# Patient Record
Sex: FEMALE | Employment: UNEMPLOYED | URBAN - METROPOLITAN AREA
[De-identification: names, ages, dates, MRNs, and addresses within clinical notes are randomized per-mention and may not be internally consistent; named-entity substitution may affect disease eponyms.]

---

## 2020-07-02 NOTE — PROGRESS NOTES
Lam Thompson   1985, 37 y.o. female   [id number]       Referring Provider: Merari Owens DO  Referral Type: In an order in [address] [place] [address]    Reason for Visit: Evaluation of suspected change in hearing, tinnitus, or balance. ADULT AUDIOLOGIC EVALUATION      Lam Thompson is a 37 y.o. female seen today, 7/6/2020 , for an initial audiologic evaluation. Patient was seen by Merari Owens DO following today's evaluation. AUDIOLOGIC AND OTHER PERTINENT MEDICAL HISTORY:      Lam Thompson noted aural fullness, tinnitus and history of occupational noise exposure. Patient reports right sided tinnitus that began about a month ago. She has been keeping cotton in her right ear to help with the sound, though it is no longer providing relief. Patient states she has experienced significant noise from working at a [employer] with a headset on her right ear. She also notes pressure in the right ear today. Lam Thompson denied otalgia, otorrhea, dizziness, imbalance, history of falls, history of head trauma, history of ear surgery and family history of hearing loss. Date: 7/6/2020     IMPRESSIONS:      AU: Hearing WNL, excellent WRS, Type A Tymps      Hearing within normal limits. Patient to follow medical recommendations per Merari Owens DO .    ASSESSMENT AND FINDINGS:     Otoscopy revealed: Clear ear canals bilaterally    RIGHT EAR:  Hearing Sensitivity: Normal hearing sensitivity  Speech Recognition Threshold: 15 dB HL  Word Recognition: Excellent (%), based on NU-6 25-word list at 45 dBHL using recorded speech stimuli. Tympanometry: Normal peak pressure and compliance, Type A tympanogram, consistent with normal middle ear function. Acoustic Reflexes: Ipsilateral: Did not test. Contralateral: Did not test.    LEFT EAR:  Hearing Sensitivity: Normal hearing sensitivity  Speech Recognition Threshold: 15 dB HL  Word Recognition: Excellent (%), based on NU-6 25-word list at 45 dBHL using recorded speech stimuli. Tympanometry: Normal peak pressure and compliance, Type A tympanogram, consistent with normal middle ear function. Acoustic Reflexes: Ipsilateral: Did not test. Contralateral: Did not test.    Reliability: Good  Transducer: Inserts    See scanned audiogram dated 7/6/2020  for results. PATIENT EDUCATION:       The following items were discussed with the patient:    - Good Communication Strategies,    - Tinnitus Management Strategies   - Noise-Induced Hearing Loss and use of Hearing Protection Devices (HPDs)    Educational information was shared in the After Visit Summary. RECOMMENDATIONS:                                                                                                                                                                                                                                                            The following items are recommended based on patient report and results from today's appointment:   - Continue medical follow-up with Rico Pena DO.   - Retest hearing as medically indicated and/or sooner if a change in hearing is noted. - Utilize \"Good Communication Strategies\" as discussed to assist in speech understanding with communication partners. ,    - Maintain a sound enriched environment to assist in the management of tinnitus symptoms.     - Use hearing protection devices (HPDs), such as protective ear muffs and ear plugs, when exposed to dangerous sound levels.        Tawanda Cheek  Audiologist    Chart CC'd to: Rico Pena DO      Degree of   Hearing Sensitivity dB Range   Within Normal Limits (WNL) 0 - 20   Mild 20 - 40   Moderate 40 - 55   Moderately-Severe 55 - 70   Severe 70 - 90   Profound 90 +

## 2020-07-06 ENCOUNTER — TELEPHONE (OUTPATIENT)
Dept: ENT CLINIC | Age: 35
End: 2020-07-06

## 2020-07-06 ENCOUNTER — PROCEDURE VISIT (OUTPATIENT)
Dept: AUDIOLOGY | Age: 35
End: 2020-07-06
Payer: COMMERCIAL

## 2020-07-06 ENCOUNTER — OFFICE VISIT (OUTPATIENT)
Dept: ENT CLINIC | Age: 35
End: 2020-07-06
Payer: COMMERCIAL

## 2020-07-06 VITALS
WEIGHT: 205.6 LBS | HEART RATE: 85 BPM | HEIGHT: 67 IN | TEMPERATURE: 97.7 F | SYSTOLIC BLOOD PRESSURE: 106 MMHG | BODY MASS INDEX: 32.27 KG/M2 | DIASTOLIC BLOOD PRESSURE: 63 MMHG

## 2020-07-06 PROCEDURE — 92557 COMPREHENSIVE HEARING TEST: CPT | Performed by: AUDIOLOGIST

## 2020-07-06 PROCEDURE — 92567 TYMPANOMETRY: CPT | Performed by: AUDIOLOGIST

## 2020-07-06 PROCEDURE — 99203 OFFICE O/P NEW LOW 30 MIN: CPT | Performed by: OTOLARYNGOLOGY

## 2020-07-06 PROCEDURE — G8417 CALC BMI ABV UP PARAM F/U: HCPCS | Performed by: OTOLARYNGOLOGY

## 2020-07-06 PROCEDURE — 1036F TOBACCO NON-USER: CPT | Performed by: OTOLARYNGOLOGY

## 2020-07-06 PROCEDURE — G8427 DOCREV CUR MEDS BY ELIG CLIN: HCPCS | Performed by: OTOLARYNGOLOGY

## 2020-07-06 RX ORDER — HYDROCODONE BITARTRATE AND ACETAMINOPHEN 5; 325 MG/1; MG/1
TABLET ORAL PRN
COMMUNITY
End: 2020-08-05

## 2020-07-06 RX ORDER — METHYLPREDNISOLONE 4 MG/1
TABLET ORAL
Qty: 1 KIT | Refills: 0 | Status: SHIPPED | OUTPATIENT
Start: 2020-07-06 | End: 2020-07-12

## 2020-07-06 NOTE — TELEPHONE ENCOUNTER
Pt called in to let Dr. Emanuel Phelps know that she checked her pulse, and it matched up with the noise she hears in her ears. She wanted to know if she should take the medication that she was prescribed today or if she \"hold off\". Please call pt back at [phone].

## 2020-07-06 NOTE — PATIENT INSTRUCTIONS
Good Communication Strategies    Communication can be challenging for anyone, but can be especially difficult for those with some degree of hearing loss. While we may not be able to control every factor that may lead to difficulty with communication, there are Good Communication Strategies that we can all use in our day-to-day lives. Communication takes both parties working together for it to be successful. Tips as a Listener:   1. Control your environment. It is important to limit the amount of background noise in the room when possible. You should also consider having a good light source in the room to best see the other person. 2. Ask for clarification. Instead of saying \"What?\", you can use parts of what you heard to make a new question. For example, if you heard the word \"Thursday\" but not the rest of the week, you may ask \"What was that about Thursday? \" or \"What did you want to do Thursday? \". This shows the person talking that you are listening and will help them better explain what they are saying. 3. Be an advocate for yourself. If you are hearing but not understanding, tell the other person \"I can hear you, but I need you to slow down when you speak. \"  Or if someone is facing the other direction, say \"I cannot hear you when you are not looking at me when we talk. \"       Tips as a Talker:   - Sit or stand 3 to 6 feet away to maximize audibility         -- It is unrealistic to believe someone else will fully hear your message if you are speaking from across the room or in a different room in the house   - Stay at eye level to help with visual cues   - Make sure you have the persons attention before speaking   - Use facial expressions and gestures to accentuate your message   - Raise your voice slightly (do not scream)   - Speak slowly and distinctly   - Use short, simple sentences   - Rephrase your words if the person is having a hard time understanding you    - To avoid distortion, dont speak directly into a persons ear      Some additional items that may be helpful:   - Remain patient - this is important for both parties   - Write down items that still cannot be heard/understood. You may write with pen/paper or consider typing/texting on a cell phone or smart device. - If background noise is unavoidable, try to keep yourself in a good position in the room. By sitting at a anand on the side of the restaurant (preferably a corner), it will be easier to communicate than if you were sitting at a table in the middle with background noise surrounding you. Keep yourself positioned away from music speakers or heavy foot traffic. Tinnitus: Overview and Management Strategies          Many people have some ringing sounds in their ears once in a while. You may hear a roar, a hiss, a tinkle, or a buzz. The sound usually lasts only a few minutes. If it goes on all the time, you may have tinnitus. Tinnitus is usually caused by long-term exposure to loud noise. This damages the nerves in the inner ear. It can occur with all types of hearing loss. It may be a symptom of almost any ear problem. Tinnitus may be caused by a buildup of earwax. Or, it may be caused by ear infections or certain medicines (especially antibiotics or large amounts of aspirin). You can also hear noises in your ears because of an injury to the ears, drinking too much alcohol or caffeine, or a medical condition. Other conditions may also contribute to tinnitus, including: head and neck trauma, temporomandibular joint disorder (TMJ), sinus pressure and barometric trauma, traumatic brain injury, metabolic disorders, autoimmune disorders, stress, and high blood pressure. You may need tests to evaluate your hearing and to find causes of long-lasting tinnitus. Your doctor may suggest one or more treatments to help you cope with the tinnitus. You can also do things at home to help reduce symptoms.     Causes of Tinnitus  We do not know the exact cause of tinnitus. One thing we do know is that you are not imagining it. If you have tinnitus, chances are the cause will remain a mystery. Conditions that might cause tinnitus include the following:    Hearing loss    Ménière's disease    Loud noise exposure    Migraines    Head injury    Drugs or medicines that are toxic to hearing    Anemia    High blood pressure    Stress    A lot of wax in the ear    Certain types of tumors    Having a lot of caffeine    Smoking cigarettes  You may find that your tinnitus is worse at night. This happens because it is quiet and you are not distracted. Feeling tired and stressed may make your tinnitus worse. Follow-up care is a key part of your treatment and safety. Be sure to make and go to all appointments, and call your doctor if you are having problems. It's also a good idea to know your test results and keep a list of the medicines you take. How can you care for yourself at home? · Limit or cut out alcohol, caffeine, and sodium. They can make your symptoms worse. · Do not smoke or use other tobacco products. Nicotine reduces blood flow to the ear and makes tinnitus worse. If you need help quitting, talk to your doctor about stop-smoking programs and medicines. These can increase your chances of quitting for good. · Talk to your doctor about whether to stop taking aspirin and similar products such as ibuprofen or naproxen. · Get exercise often. It can help improve blood flow to the ear. Hearing Aids and Other Devices  A hearing aid may help your tinnitus if you have a hearing loss. An audiologist can help you find and use the best hearing aid for you. Tinnitus maskers look like hearing aids. They make a sound that masks, or covers up, the tinnitus. The masking sound distracts you from the ringing in your ears. You may be able to use a masker and a hearing aid at the same time. Sound machines can be useful at night or during quiet times. There are machines you can buy at the store. Or, you can find apps on your phone that make sounds, like the ocean or rainfall. Fish tanks, fans, quiet music, and indoor waterfalls can help, as well. Ways to manage/cope with tinnitus  Some tinnitus may last a long time. To manage your tinnitus, try to:  · Avoid noises that you think caused your tinnitus. If you can't avoid loud noises, wear earplugs or earmuffs. · Ignore the sound by paying attention to other things. Keeping your brain busy with other tasks or background noise can help your brain not focus on the tinnitus. · Try to not give the tinnitus an emotional reaction. Do your best to ignore the sound and not let it bother you. Relax using biofeedback, meditation, or yoga. Feeling stressed and being tired can make tinnitus worse. · Play music or white noise to help you sleep. Background noise may cover up the noise that you hear in your ears. You can buy a tabletop machine or a device that sits under your pillow to play soothing sounds, like ocean waves. · Smart phones have free apps, such as Whist, Relax Melodies, ReSound Relief, and White Noise Lite. These apps have different types of sounds/noise, some of which you can blend together to find sounds that are most soothing to you. · Hearing aid technology, especially when there is some hearing loss, may help reduce tinnitus symptoms by giving your brain better access to the sounds it is missing. There are some hearing aids with built-in noise generator programs, which may help when amplification alone is not enough. Additional resources may be found through the American Tinnitus Association at www.estefani.org    When should you call for help? Call 911 anytime you think you may need emergency care. For example, call if:    · You have symptoms of a stroke.  These may include:  ? Sudden numbness, tingling, weakness, or loss of movement in your face, arm, or leg, especially on only one side of your body. ? Sudden vision changes. ? Sudden trouble speaking. ? Sudden confusion or trouble understanding simple statements. ? Sudden problems with walking or balance. ? A sudden, severe headache that is different from past headaches. Call your doctor now or seek immediate medical care if:    · You develop other symptoms. These may include hearing loss (or worse hearing loss), balance problems, dizziness, nausea, or vomiting. Watch closely for changes in your health, and be sure to contact your doctor if:    · Your tinnitus moves from both ears to one ear. · Your hearing loss gets worse within 1 day after an ear injury. · Your tinnitus or hearing loss does not get better within 1 week after an ear injury. · Your tinnitus bothers you enough that you want to take medicines to help you cope with it. If you notice changes in your tinnitus and/or your hearing, it is recommended that you have your hearing tested by your audiologist and to follow-up with your physician that manages your hearing loss (such as your ENT or Primary Care doctor). Tinnitus Apps: The following are tinnitus applications created by hearing aid companies. They play environmental sounds that can help to reduce the perception of tinnitus.  Julienne Montalvo                         Resound Tinnitus Relief        Phonak Tinnitus Balance        Noise-Induced Hearing Loss  What it is, and what you can do to prevent it      Exposure to loud sounds, in an occupational setting or recreational, can cause permanent hearing loss. Sound is measured in decibels (dB). Noise-induced hearing loss is the ONLY type of preventable hearing loss. Hearing loss related to noise exposure can occur at any age. There are small sensory cells, called inner and outer hair cells, within the inner ear (cochlea).   These cells process the loudness (intensity) and pitch (frequency) of sound and send the signal to the brain via our auditory nerve (vestibulocochlear nerve, cranial nerve VIII). When these cells are damaged, they can result in permanent hearing loss and/or tinnitus. The hair cells responsible for high frequency sounds, like birds chirping, are most likely to be damaged due to loud sounds. The high frequency sounds are also very important for our clarity and understanding of speech. OCCUPATIONAL NOISE EXPOSURE RECREATIONAL NOISE EXPOSURE   Some jobs may have exposure to loud sounds in the workplace. These jobs may include but are not limited to:  [employer]   Construction   Welding   Landscaping   Hairdressing/hairstyling   Musicians  [employer]    ... And more! Many activities outside of work may cause permanent hearing loss. These activities may include but are not limited to:  Lawnmowers, leaf blowers  [employer] (such as pigs squealing)   Chainsaws and other power tools  [place] musical instruments and/or singing   Listening to music too loudly - at concerts, through stereo, through ear buds or headphones   Attending sporting events   Attending fireworks shows or using fireworks at home  [employer] of firearms   . .. And more! REDUCE OR PROTECT YOUR EARS FROM NOISE EXPOSURE    To do your best to avoid noise-induced hearing loss, here are some tips:   Limit exposure to loud sounds. 85 dB (decibels) is safe for 8 hours. As sounds are louder, the length of time the sound is safe lessens. These numbers are cumulative across a 24-hour period.   (NIOSH and CDC, 2002)  o 85 dB is safe for 8 hours  o 88 dB is safe for 4 hours  o 91 dB is safe for 2 hours  o 94 dB is safe for 1 hour  o 97 dB is safe for 30 minutes  o 100 dB is safe for 15 minutes  o 103 dB is safe for 7.5 minutes  o 106 dB is safe for 3.75 minutes  o 109 dB is safe for LESS THAN 2 minutes  o 112 dB is safe for LESS THAN 1 minute  o 115 dB is safe for ~ 30 seconds  o 130 dB can

## 2020-07-06 NOTE — PROGRESS NOTES
[address], [address], [facility] Kevin Wise  P: [phone]       Patient     Tiburcio Franco  1985    ChiefComplaint     Chief Complaint   Patient presents with    Ear Problem     Has had a lot pain in right ear, hears a \"heart beat\"/ breathing sound in right ear. Went to ED in [place] [address], said they saw fluid behind ear, was given amoxicillin,  did not help. History of Present Illness     Trish Leblanc is a 35F presenting for evaluation of intermittent right ear pain and sound of \"schussing\" in her right ear. Present intermittently for 1 month. Seen at ED in [place] and told she had fluid behind the ear, given abx without improvement. Has been taking tylenol for pain as needed. Denies previous ear surgery, hearing loss vertigo, otorrhea. Denies grinding or clenching of teeth. Patient was unsure if it coordinated with her heartbeat- time taken to help patient find pulse- confirmed that it does not coordinate with heartbeat. Past Medical History     No past medical history on file. Past Surgical History     No past surgical history on file. Family History     No family history on file. Social History     Social History     Tobacco Use    Smoking status: Never Smoker    Smokeless tobacco: Never Used   Substance Use Topics    Alcohol use: Not Currently    Drug use: Not on file        Allergies     Allergies   Allergen Reactions    Ibuprofen Swelling       Medications     Current Outpatient Medications   Medication Sig Dispense Refill    HYDROcodone-acetaminophen (NORCO) 5-325 MG per tablet Take by mouth as needed (as needed for pain).  methylPREDNISolone (MEDROL DOSEPACK) 4 MG tablet Take by mouth. 1 kit 0     No current facility-administered medications for this visit. Review of Systems     Review of Systems   Constitutional: Negative for appetite change, chills, fatigue, fever and unexpected weight change. HENT: Positive for ear pain and tinnitus. Negative for congestion, ear discharge, facial swelling, hearing loss, nosebleeds, postnasal drip, sinus pressure, sneezing, sore throat, trouble swallowing and voice change. Eyes: Negative for itching. Respiratory: Negative for apnea, cough and shortness of breath. Gastrointestinal:        Negative for dysphasia   Endocrine: Negative for cold intolerance and heat intolerance. Musculoskeletal: Negative for myalgias and neck pain. Skin: Negative for rash. Allergic/Immunologic: Negative for environmental allergies. Neurological: Negative for dizziness and headaches. Psychiatric/Behavioral: Negative for confusion, decreased concentration and sleep disturbance. PhysicalExam     Vitals:    07/06/20 1410   BP: 106/63   Site: Left Upper Arm   Position: Sitting   Cuff Size: Medium Adult   Pulse: 85   Temp: 97.7 °F (36.5 °C)   TempSrc: Oral   Weight: 205 lb 9.6 oz (93.3 kg)   Height: 5' 7\" (1.702 m)       Physical Exam  Constitutional:       General: She is not in acute distress. Appearance: She is well-developed. HENT:      Head: Normocephalic and atraumatic. Jaw: Tenderness (right TMJ) present. Right Ear: Tympanic membrane, ear canal and external ear normal. No drainage. No middle ear effusion. Tympanic membrane is not bulging. Tympanic membrane has normal mobility. Left Ear: Tympanic membrane, ear canal and external ear normal. No drainage. No middle ear effusion. Tympanic membrane is not bulging. Tympanic membrane has normal mobility. Ears:      Comments: No objective tinnitus     Nose: No septal deviation, mucosal edema or rhinorrhea. Mouth/Throat:      Lips: Pink. Mouth: Mucous membranes are moist.      Tongue: No lesions. Palate: No mass. Pharynx: Uvula midline. Tonsils: No tonsillar exudate. 1+ on the right. 1+ on the left. Eyes:      Pupils: Pupils are equal, round, and reactive to light.    Neck:      Musculoskeletal: Full passive range of motion without pain. Thyroid: No thyroid mass or thyromegaly. Trachea: Trachea and phonation normal.   Cardiovascular:      Pulses: Normal pulses. Pulmonary:      Effort: Pulmonary effort is normal. No accessory muscle usage or respiratory distress. Breath sounds: No stridor. Lymphadenopathy:      Head:      Right side of head: No submental or submandibular adenopathy. Left side of head: No submental or submandibular adenopathy. Cervical: No cervical adenopathy. Right cervical: No superficial, deep or posterior cervical adenopathy. Left cervical: No superficial, deep or posterior cervical adenopathy. Skin:     General: Skin is warm and dry. Neurological:      Mental Status: She is alert and oriented to person, place, and time. Cranial Nerves: No cranial nerve deficit. Coordination: Coordination normal.      Gait: Gait normal.   Psychiatric:         Thought Content: Thought content normal.             Assessment and Plan     1. Tinnitus of right ear  -non objective and not insync with heartbeat  -hearing and examination normal  -may be secodary to inflammation from untreated TMJ    2. Right ear pain  -reproducible with palpation of right jaw joint  -warm compress, tylenol (allergic to motrin), soft diet  - methylPREDNISolone (MEDROL DOSEPACK) 4 MG tablet; Take by mouth. Dispense: 1 kit; Refill: 0      Return in 2 weeks if no improvement    Emanuel Mckeon, DO  7/8/20      Portions of this note were dictated using Dragon.  There may be linguistic errors secondary to the use of this program.

## 2020-07-07 ENCOUNTER — TELEPHONE (OUTPATIENT)
Dept: ENT CLINIC | Age: 35
End: 2020-07-07

## 2020-07-08 ASSESSMENT — ENCOUNTER SYMPTOMS
SORE THROAT: 0
SINUS PRESSURE: 0
VOICE CHANGE: 0
SHORTNESS OF BREATH: 0
FACIAL SWELLING: 0
COUGH: 0
TROUBLE SWALLOWING: 0
APNEA: 0
EYE ITCHING: 0

## 2020-07-08 NOTE — TELEPHONE ENCOUNTER
Pt returned phone call, I was informed that Dr. Lucero Christine was running to do an urgent consult and would call the patient back in the morning.

## 2020-07-09 NOTE — PROGRESS NOTES
[facility]. She states she rechecked the sound in her ear with her heartbeat and feels that it does sink. We discussed possible etiologies of pulsatile tinnitus. She wishes to proceed with further evaluation at this time. CT of the temporal bone ordered I will call her once I have results.

## 2020-07-17 ENCOUNTER — HOSPITAL ENCOUNTER (OUTPATIENT)
Dept: CT IMAGING | Age: 35
Discharge: HOME | End: 2020-07-17
Payer: COMMERCIAL

## 2020-07-17 PROCEDURE — 70480 CT ORBIT/EAR/FOSSA W/O DYE: CPT

## 2020-07-20 ENCOUNTER — TELEPHONE (OUTPATIENT)
Dept: ENT CLINIC | Age: 35
End: 2020-07-20

## 2020-07-21 ENCOUNTER — OFFICE VISIT (OUTPATIENT)
Dept: ENT CLINIC | Age: 35
End: 2020-07-21
Payer: COMMERCIAL

## 2020-07-21 VITALS
DIASTOLIC BLOOD PRESSURE: 73 MMHG | TEMPERATURE: 97.3 F | BODY MASS INDEX: 31.23 KG/M2 | SYSTOLIC BLOOD PRESSURE: 123 MMHG | HEIGHT: 67 IN | HEART RATE: 103 BPM | WEIGHT: 199 LBS

## 2020-07-21 PROCEDURE — 99213 OFFICE O/P EST LOW 20 MIN: CPT | Performed by: OTOLARYNGOLOGY

## 2020-07-21 PROCEDURE — G8427 DOCREV CUR MEDS BY ELIG CLIN: HCPCS | Performed by: OTOLARYNGOLOGY

## 2020-07-21 PROCEDURE — 1036F TOBACCO NON-USER: CPT | Performed by: OTOLARYNGOLOGY

## 2020-07-21 PROCEDURE — G8417 CALC BMI ABV UP PARAM F/U: HCPCS | Performed by: OTOLARYNGOLOGY

## 2020-07-21 ASSESSMENT — ENCOUNTER SYMPTOMS
FACIAL SWELLING: 0
VOICE CHANGE: 0
COUGH: 0
TROUBLE SWALLOWING: 0
SHORTNESS OF BREATH: 0
EYE ITCHING: 0
APNEA: 0
SORE THROAT: 0
SINUS PRESSURE: 0

## 2020-07-21 NOTE — PROGRESS NOTES
cough and shortness of breath. Gastrointestinal:        Negative for dysphasia   Endocrine: Negative for cold intolerance and heat intolerance. Musculoskeletal: Negative for myalgias and neck pain. Skin: Negative for rash. Allergic/Immunologic: Negative for environmental allergies. Neurological: Negative for dizziness and headaches. Psychiatric/Behavioral: Negative for confusion, decreased concentration and sleep disturbance. PhysicalExam     Vitals:    07/21/20 1112   BP: 123/73   Site: Right Upper Arm   Position: Sitting   Cuff Size: Medium Adult   Pulse: 103   Temp: 97.3 °F (36.3 °C)   TempSrc: Infrared   Weight: 199 lb (90.3 kg)   Height: 5' 7\" (1.702 m)       Physical Exam  Constitutional:       General: She is not in acute distress. Appearance: She is well-developed. HENT:      Head: Normocephalic and atraumatic. Right Ear: Tympanic membrane, ear canal and external ear normal. No drainage. No middle ear effusion. Tympanic membrane is not bulging. Tympanic membrane has normal mobility. Left Ear: Tympanic membrane, ear canal and external ear normal. No drainage. No middle ear effusion. Tympanic membrane is not bulging. Tympanic membrane has normal mobility. Nose: No septal deviation, mucosal edema or rhinorrhea. Mouth/Throat:      Lips: Pink. Mouth: Mucous membranes are moist.      Tongue: No lesions. Palate: No mass. Pharynx: Uvula midline. Tonsils: No tonsillar exudate. 2+ on the right. 2+ on the left. Eyes:      Pupils: Pupils are equal, round, and reactive to light. Neck:      Musculoskeletal: Full passive range of motion without pain. Thyroid: No thyroid mass or thyromegaly. Trachea: Trachea and phonation normal.   Cardiovascular:      Pulses: Normal pulses. Pulmonary:      Effort: Pulmonary effort is normal. No accessory muscle usage or respiratory distress. Breath sounds: No stridor.    Lymphadenopathy:      Head: Right side of head: No submental or submandibular adenopathy. Left side of head: No submental or submandibular adenopathy. Cervical: No cervical adenopathy. Right cervical: No superficial, deep or posterior cervical adenopathy. Left cervical: No superficial, deep or posterior cervical adenopathy. Skin:     General: Skin is warm and dry. Neurological:      Mental Status: She is alert and oriented to person, place, and time. Cranial Nerves: No cranial nerve deficit. Coordination: Coordination normal.      Gait: Gait normal.   Psychiatric:         Thought Content: Thought content normal.         Assessment and Plan     1. Pulsatile tinnitus of right ear  -CT temporal bone normal  -Discussed neck step in evaluation is MRV/MRA to evaluate for intracranial vascular cost  - MRV HEAD W CONTRAST; Future  -Discussed referral to neurotology for second opinion if imaging normal    2. Right ear pain  -Resolved after steroid use      I will call with results    Chad Mari DO  7/21/20      Portions of this note were dictated using Dragon.  There may be linguistic errors secondary to the use of this program.

## 2020-07-22 ENCOUNTER — TELEPHONE (OUTPATIENT)
Dept: ENT CLINIC | Age: 35
End: 2020-07-22

## 2020-07-22 NOTE — TELEPHONE ENCOUNTER
Called pt to let her know that Dr. Emanuel Phelps had placed a new order and she is good to schedule her imaging now. She did not answer so I left a message asking her to call us back.

## 2020-07-22 NOTE — TELEPHONE ENCOUNTER
Wilfrido Brooks with Central scheduling called and asked that a new order fro the Patients MRV be changed from \"W contrast\" to \"W WO Contrast\". I asked Dr. Cedric Lozano to put in a new order and she said that she would.

## 2020-07-22 NOTE — TELEPHONE ENCOUNTER
Called Abiola with [facility] to let her know that Dr. Burkett Generous placed a new order.  Erin Camp did not answer so I asked her to call me back at my direct line at [phone]

## 2020-07-22 NOTE — TELEPHONE ENCOUNTER
Patient returned call placed by staff. This writer advised patient of the below message. Patient advises that she already has everything set up, and hangs up on this writer.

## 2020-07-31 ENCOUNTER — HOSPITAL ENCOUNTER (OUTPATIENT)
Dept: MRI IMAGING | Age: 35
Discharge: HOME | End: 2020-07-31
Payer: COMMERCIAL

## 2020-07-31 PROCEDURE — 70546 MR ANGIOGRAPH HEAD W/O&W/DYE: CPT

## 2020-07-31 PROCEDURE — A9579 GAD-BASE MR CONTRAST NOS,1ML: HCPCS | Performed by: OTOLARYNGOLOGY

## 2020-07-31 PROCEDURE — 6360000004 HC RX CONTRAST MEDICATION: Performed by: OTOLARYNGOLOGY

## 2020-07-31 RX ADMIN — GADOTERIDOL 18 ML: 279.3 INJECTION, SOLUTION INTRAVENOUS at 09:52

## 2020-08-03 ENCOUNTER — TELEPHONE (OUTPATIENT)
Dept: FAMILY MEDICINE CLINIC | Age: 35
End: 2020-08-03

## 2020-08-03 NOTE — TELEPHONE ENCOUNTER
----- Message from Kirk Dia sent at 7/31/2020  4:00 PM EDT -----  Subject: Appointment Request    Reason for Call: New Patient Request Appointment    QUESTIONS  Type of Appointment? New Patient/New to Provider  Reason for appointment request? No appointments available during search  Additional Information for Provider?   ---------------------------------------------------------------------------  --------------  CALL BACK INFO  What is the best way for the office to contact you? OK to leave message on   voicemail  Preferred Call Back Phone Number? [phone]  ---------------------------------------------------------------------------  --------------  SCRIPT ANSWERS  Relationship to Patient? Self  Appointment reason? Establish Care/Find a provider  Have you been diagnosed with   tested for   or told that you are suspected of having COVID-19 (Coronavirus)? No  Have you had a fever or taken medication to treat a fever within the past   3 days? No  Have you had a cough   shortness of breath or flu-like symptoms within the past 3 days? No  Do you currently have flu-like symptoms including fever or chills   cough   shortness of breath   or difficulty breathing   or new loss of taste or smell? No  (Service Expert  click yes below to proceed with [employer] As Usual   Scheduling)?  Yes

## 2020-08-05 ENCOUNTER — OFFICE VISIT (OUTPATIENT)
Dept: FAMILY MEDICINE CLINIC | Age: 35
End: 2020-08-05
Payer: COMMERCIAL

## 2020-08-05 VITALS
OXYGEN SATURATION: 97 % | HEART RATE: 90 BPM | BODY MASS INDEX: 31.07 KG/M2 | DIASTOLIC BLOOD PRESSURE: 70 MMHG | SYSTOLIC BLOOD PRESSURE: 120 MMHG | TEMPERATURE: 97.9 F | WEIGHT: 198.4 LBS

## 2020-08-05 DIAGNOSIS — Z3A.16 16 WEEKS GESTATION OF PREGNANCY: ICD-10-CM

## 2020-08-05 PROBLEM — E04.9 GOITER: Status: ACTIVE | Noted: 2020-08-05

## 2020-08-05 PROBLEM — F43.10 POSTTRAUMATIC STRESS DISORDER: Status: ACTIVE | Noted: 2020-08-05

## 2020-08-05 PROBLEM — R59.0 LOCALIZED ENLARGED LYMPH NODES: Status: ACTIVE | Noted: 2020-08-05

## 2020-08-05 PROBLEM — K21.9 GASTRO-ESOPHAGEAL REFLUX: Status: ACTIVE | Noted: 2020-08-05

## 2020-08-05 PROBLEM — A60.00 GENITAL HERPES SIMPLEX: Status: ACTIVE | Noted: 2020-08-05

## 2020-08-05 PROBLEM — F43.22 ADJUSTMENT DISORDER WITH ANXIETY: Status: ACTIVE | Noted: 2020-08-05

## 2020-08-05 PROBLEM — Z56.9 EMPLOYMENT PROBLEM: Status: ACTIVE | Noted: 2020-08-05

## 2020-08-05 PROBLEM — E55.9 VITAMIN D DEFICIENCY: Status: ACTIVE | Noted: 2020-08-05

## 2020-08-05 PROBLEM — O34.40 LOW GRADE SQUAMOUS INTRAEPITHELIAL CERVICAL DYSPLASIA AFFECTING PREGNANCY, ANTEPARTUM: Status: ACTIVE | Noted: 2020-08-05

## 2020-08-05 PROBLEM — B96.89 BACTERIAL VAGINOSIS: Status: ACTIVE | Noted: 2020-08-05

## 2020-08-05 PROBLEM — R87.610 ATYPICAL SQUAMOUS CELLS OF UNDETERMINED SIGNIFICANCE (ASCUS) ON PAPANICOLAOU SMEAR OF CERVIX: Status: ACTIVE | Noted: 2020-08-05

## 2020-08-05 PROBLEM — F40.01 PANIC DISORDER WITH AGORAPHOBIA: Status: ACTIVE | Noted: 2020-08-05

## 2020-08-05 PROBLEM — F32.A DEPRESSIVE DISORDER: Status: ACTIVE | Noted: 2020-08-05

## 2020-08-05 PROBLEM — L20.82 FLEXURAL ECZEMA: Status: ACTIVE | Noted: 2020-08-05

## 2020-08-05 PROBLEM — R87.612 LOW GRADE SQUAMOUS INTRAEPITHELIAL CERVICAL DYSPLASIA AFFECTING PREGNANCY, ANTEPARTUM: Status: ACTIVE | Noted: 2020-08-05

## 2020-08-05 PROBLEM — L81.9 HYPERPIGMENTATION OF SKIN: Status: ACTIVE | Noted: 2020-08-05

## 2020-08-05 PROBLEM — O92.5: Status: ACTIVE | Noted: 2020-08-05

## 2020-08-05 PROBLEM — O35.9XX0 FETAL ABNORMALITY AFFECTING MANAGEMENT OF MOTHER: Status: ACTIVE | Noted: 2020-08-05

## 2020-08-05 PROBLEM — F43.25 ADJUSTMENT DISORDER WITH MIXED DISTURBANCE OF EMOTIONS AND CONDUCT: Status: ACTIVE | Noted: 2020-08-05

## 2020-08-05 PROBLEM — N76.0 BACTERIAL VAGINOSIS: Status: ACTIVE | Noted: 2020-08-05

## 2020-08-05 PROBLEM — H52.229 REGULAR ASTIGMATISM: Status: ACTIVE | Noted: 2020-08-05

## 2020-08-05 PROBLEM — R73.09 ABNORMAL GLUCOSE: Status: ACTIVE | Noted: 2020-08-05

## 2020-08-05 LAB
A/G RATIO: 1.3 (ref 1.1–2.2)
ALBUMIN SERPL-MCNC: 4 G/DL (ref 3.4–5)
ALP BLD-CCNC: 37 U/L (ref 40–129)
ALT SERPL-CCNC: 10 U/L (ref 10–40)
ANION GAP SERPL CALCULATED.3IONS-SCNC: 15 MMOL/L (ref 3–16)
AST SERPL-CCNC: 10 U/L (ref 15–37)
BASOPHILS ABSOLUTE: 0 K/UL (ref 0–0.2)
BASOPHILS RELATIVE PERCENT: 0.6 %
BILIRUB SERPL-MCNC: 0.4 MG/DL (ref 0–1)
BILIRUBIN, POC: NEGATIVE
BLOOD URINE, POC: ABNORMAL
BUN BLDV-MCNC: 5 MG/DL (ref 7–20)
CALCIUM SERPL-MCNC: 9.3 MG/DL (ref 8.3–10.6)
CHLORIDE BLD-SCNC: 102 MMOL/L (ref 99–110)
CLARITY, POC: ABNORMAL
CO2: 22 MMOL/L (ref 21–32)
COLOR, POC: YELLOW
CONTROL: ABNORMAL
CREAT SERPL-MCNC: <0.5 MG/DL (ref 0.6–1.1)
EOSINOPHILS ABSOLUTE: 0 K/UL (ref 0–0.6)
EOSINOPHILS RELATIVE PERCENT: 0.3 %
GFR AFRICAN AMERICAN: >60
GFR NON-AFRICAN AMERICAN: >60
GLOBULIN: 3.1 G/DL
GLUCOSE BLD-MCNC: 117 MG/DL (ref 70–99)
GLUCOSE URINE, POC: NEGATIVE
HBV SURFACE AB TITR SER: >1000 MIU/ML
HCT VFR BLD CALC: 39 % (ref 36–48)
HEMOGLOBIN: 13.1 G/DL (ref 12–16)
HEPATITIS C ANTIBODY INTERPRETATION: NORMAL
KETONES, POC: NEGATIVE
LEUKOCYTE EST, POC: NEGATIVE
LYMPHOCYTES ABSOLUTE: 1.8 K/UL (ref 1–5.1)
LYMPHOCYTES RELATIVE PERCENT: 24.5 %
MCH RBC QN AUTO: 30.4 PG (ref 26–34)
MCHC RBC AUTO-ENTMCNC: 33.7 G/DL (ref 31–36)
MCV RBC AUTO: 90.4 FL (ref 80–100)
MONOCYTES ABSOLUTE: 0.7 K/UL (ref 0–1.3)
MONOCYTES RELATIVE PERCENT: 9.5 %
NEUTROPHILS ABSOLUTE: 4.7 K/UL (ref 1.7–7.7)
NEUTROPHILS RELATIVE PERCENT: 65.1 %
NITRITE, POC: NEGATIVE
PDW BLD-RTO: 14.1 % (ref 12.4–15.4)
PH, POC: 7.5
PLATELET # BLD: 292 K/UL (ref 135–450)
PMV BLD AUTO: 8.1 FL (ref 5–10.5)
POTASSIUM SERPL-SCNC: 3.8 MMOL/L (ref 3.5–5.1)
PREGNANCY TEST URINE, POC: POSITIVE
PROTEIN, POC: NEGATIVE
RBC # BLD: 4.32 M/UL (ref 4–5.2)
RUBELLA ANTIBODY IGG: >500 IU/ML
SODIUM BLD-SCNC: 139 MMOL/L (ref 136–145)
SPECIFIC GRAVITY, POC: 1.02
TOTAL PROTEIN: 7.1 G/DL (ref 6.4–8.2)
TSH REFLEX: 0.6 UIU/ML (ref 0.27–4.2)
UROBILINOGEN, POC: 0.2
VITAMIN D 25-HYDROXY: 24.1 NG/ML
WBC # BLD: 7.3 K/UL (ref 4–11)

## 2020-08-05 PROCEDURE — 81002 URINALYSIS NONAUTO W/O SCOPE: CPT | Performed by: FAMILY MEDICINE

## 2020-08-05 PROCEDURE — 1036F TOBACCO NON-USER: CPT | Performed by: FAMILY MEDICINE

## 2020-08-05 PROCEDURE — 99204 OFFICE O/P NEW MOD 45 MIN: CPT | Performed by: FAMILY MEDICINE

## 2020-08-05 PROCEDURE — G8427 DOCREV CUR MEDS BY ELIG CLIN: HCPCS | Performed by: FAMILY MEDICINE

## 2020-08-05 PROCEDURE — G8417 CALC BMI ABV UP PARAM F/U: HCPCS | Performed by: FAMILY MEDICINE

## 2020-08-05 PROCEDURE — 81025 URINE PREGNANCY TEST: CPT | Performed by: FAMILY MEDICINE

## 2020-08-05 RX ORDER — PSEUDOEPHED/ACETAMINOPH/DIPHEN 30MG-500MG
TABLET ORAL
COMMUNITY
Start: 2020-07-02 | End: 2020-08-05 | Stop reason: ALTCHOICE

## 2020-08-05 RX ORDER — PRENATAL VIT/IRON FUM/FOLIC AC 27MG-0.8MG
1 TABLET ORAL DAILY
Qty: 90 TABLET | Refills: 1 | Status: SHIPPED | OUTPATIENT
Start: 2020-08-05 | End: 2020-09-11 | Stop reason: SDUPTHER

## 2020-08-05 ASSESSMENT — ENCOUNTER SYMPTOMS
VOMITING: 0
ABDOMINAL PAIN: 1
NAUSEA: 1
TROUBLE SWALLOWING: 0
SHORTNESS OF BREATH: 0
BLOOD IN STOOL: 0
CONSTIPATION: 0
COLOR CHANGE: 0
BACK PAIN: 0
COUGH: 0
DIARRHEA: 0

## 2020-08-05 NOTE — PROGRESS NOTES
8/5/2020    This is a 37 y.o. female who presents for  Chief Complaint   Patient presents with    New Patient       HPI:     Establish care  Recent care provided at the [facility] here recently    Has been feeling off for a few months  Thought she had COVID but refused to go to the hospital  Ended up going, did a pregnancy test in May and it was negative  LMP: April, ? 12th  16w3d    Has been to the dentist  Had an MRI and CT, signed a waiver for the CT scan for pregnancy    She needs proof today that her test was positive  \"I would not have moved here if I would have known I was pregnant. I am not even sure I want to keep the baby. \"     History reviewed. No pertinent past medical history. No past surgical history on file.     Social History     Socioeconomic History    Marital status: Single     Spouse name: Not on file    Number of children: Not on file    Years of education: Not on file    Highest education level: Not on file   Occupational History    Not on file   Social Needs    Financial resource strain: Not on file    Food insecurity     Worry: Not on file     Inability: Not on file    Transportation needs     Medical: Not on file     Non-medical: Not on file   Tobacco Use    Smoking status: Never Smoker    Smokeless tobacco: Never Used   Substance and Sexual Activity    Alcohol use: Not Currently    Drug use: Never    Sexual activity: Yes   Lifestyle    Physical activity     Days per week: Not on file     Minutes per session: Not on file    Stress: Not on file   Relationships    Social connections     Talks on phone: Not on file     Gets together: Not on file     Attends Rastafari service: Not on file     Active member of club or organization: Not on file     Attends meetings of clubs or organizations: Not on file     Relationship status: Not on file    Intimate partner violence     Fear of current or ex partner: Not on file     Emotionally abused: Not on file     Physically abused: Not on file     Forced sexual activity: Not on file   Other Topics Concern    Not on file   Social History Narrative    Not on file       No family history on file. Current Outpatient Medications   Medication Sig Dispense Refill    Prenatal Vit-Fe Fumarate-FA (PRENATAL LOW IRON) 27-0.8 MG TABS Take 1 tablet by mouth daily 90 tablet 1     No current facility-administered medications for this visit. Immunization History   Administered Date(s) Administered    Anthrax (BioThrax) 09/18/2007, 11/09/2007    HPV (Human Papilloma Virus)Vaccine 11/15/2011    HPV Quadrivalent (Gardasil) 03/19/2007, 04/18/2007, 01/15/2008    Hepatitis A Adult (Havrix, Vaqta) 10/11/2005, 03/19/2007    Hepatitis A/Hepatitis B (Twinrix) 10/11/2005, 11/14/2005, 03/19/2007    Hepatitis B 10/11/2005, 11/14/2005, 03/19/2007    Influenza A (S7S3-68) Vaccine IM 01/29/2010    Influenza A (S4g8-29),all Formulations 02/28/2010    Influenza Virus Vaccine 11/01/2005, 11/05/2005, 03/19/2007, 11/09/2007, 11/23/2009, 01/29/2010    MMR 10/11/2005    Meningococcal MPSV4 (Menomune) 10/11/2005    PPD Test 03/19/2007, 01/15/2008, 01/29/2010    Polio IPV (IPOL) 10/11/2005    Td, unspecified formulation 10/11/2005    Typhoid Vi capsular polysaccharide (Typhim VI) 11/14/2005    Vaccinia - Smallpox ([id number]) 11/06/2007, 11/09/2007    Yellow Fever (YF-Vax) 11/14/2005       Allergies   Allergen Reactions    Ibuprofen Swelling       Review of Systems   Constitutional: Positive for activity change, appetite change and fatigue. Negative for chills, diaphoresis, fever and unexpected weight change. HENT: Negative for ear pain, hearing loss and trouble swallowing. Eyes: Negative for visual disturbance. Respiratory: Negative for cough and shortness of breath. Cardiovascular: Negative for chest pain, palpitations and leg swelling. Gastrointestinal: Positive for abdominal pain and nausea.  Negative for blood in stool, constipation, diarrhea and vomiting. Genitourinary: Negative for decreased urine volume, difficulty urinating, dysuria, flank pain, hematuria and urgency. Musculoskeletal: Positive for arthralgias. Negative for back pain. Skin: Positive for rash. Negative for color change. Neurological: Positive for dizziness and headaches. Negative for weakness, light-headedness and numbness. Psychiatric/Behavioral: Positive for sleep disturbance. Negative for dysphoric mood. The patient is not nervous/anxious. /70 (Site: Left Upper Arm, Position: Sitting, Cuff Size: Medium Adult)   Pulse 90   Temp 97.9 °F (36.6 °C) (Temporal)   Wt 198 lb 6.4 oz (90 kg)   LMP 04/05/2020   SpO2 97%   BMI 31.07 kg/m²     Physical Exam  Vitals signs and nursing note reviewed. Constitutional:       General: She is not in acute distress. Appearance: She is well-developed. She is not diaphoretic. HENT:      Head: Normocephalic and atraumatic. Eyes:      Conjunctiva/sclera: Conjunctivae normal.      Pupils: Pupils are equal, round, and reactive to light. Neck:      Musculoskeletal: Normal range of motion and neck supple. Vascular: No JVD. Cardiovascular:      Rate and Rhythm: Normal rate and regular rhythm. Heart sounds: Normal heart sounds. No murmur. No friction rub. No gallop. Pulmonary:      Effort: Pulmonary effort is normal. No respiratory distress. Breath sounds: Normal breath sounds. No wheezing or rales. Chest:      Chest wall: No tenderness. Abdominal:      Palpations: Abdomen is soft. There is mass. Tenderness: There is no abdominal tenderness. Comments: Fundal height just below umbilicus, S=D based on LMP   Musculoskeletal: Normal range of motion. Skin:     General: Skin is warm and dry. Capillary Refill: Capillary refill takes 2 to 3 seconds. Findings: No erythema. Neurological:      Mental Status: She is alert and oriented to person, place, and time.    Psychiatric: Behavior: Behavior normal.         Thought Content: Thought content normal.         Judgment: Judgment normal.         Plan  1. Encounter to establish care    2. Healthcare maintenance    3. 16 weeks gestation of pregnancy  Details per HPI  LMP is estimated around 4/12/20, 16w3d based on this  S=D on exam, fundal height just below umbilicus   Had a pregnancy test in May which was reportedly negative  She is unsure if she wants to continue this pregnancy  She requests \"proof for the father that she is not making this up\"  PNV sent to pharmacy on file. Denies any tobacco or alcohol use in the past 6 months     - POCT urine pregnancy  - [address], Viki Rose, DO, Obstetrics, [place]    - Comprehensive Metabolic Panel; Future  - CBC Auto Differential; Future  - Hemoglobin A1C; Future  - HIV Screen; Future  - TSH with Reflex; Future  - RPR TITER; Future  - Hepatitis B Surface Antibody; Future  - C.trachomatis N.gonorrhoeae DNA, Urine  - Hepatitis C Antibody; Future  - RUBELLA ANTIBODY, IGG; Future  - Vitamin D 25 Hydroxy; Future    While assessing care for this patient, I have reviewed all pertinent lab work/imaging/ specialist notes and care in reference to those problems addressed above in detail. Appropriate medical decision making was based on this. Please note that portions of this note may have been completed with a voice recognition program. Efforts were made to edit the dictations but occasionally words are mis-transcribed. Return if symptoms worsen or fail to improve.

## 2020-08-05 NOTE — Clinical Note
Good afternoon,     This was an interesting first visit today. I just wanted to give you an FYI that this patient was referred your way. Based on dates and exam, she's around 16 weeks, unplanned. I did order basic labs, as I was unsure if she would follow through with a visit with you all. Thanks for your help!   Lg Hunt

## 2020-08-06 LAB
C. TRACHOMATIS DNA ,URINE: NEGATIVE
ESTIMATED AVERAGE GLUCOSE: 108.3 MG/DL
HBA1C MFR BLD: 5.4 %
HIV AG/AB: NORMAL
HIV ANTIGEN: NORMAL
HIV-1 ANTIBODY: NORMAL
HIV-2 AB: NORMAL
N. GONORRHOEAE DNA, URINE: NEGATIVE
TOTAL SYPHILLIS IGG/IGM: NORMAL

## 2020-08-10 ENCOUNTER — INITIAL PRENATAL (OUTPATIENT)
Dept: OBGYN CLINIC | Age: 35
End: 2020-08-10
Payer: COMMERCIAL

## 2020-08-10 ENCOUNTER — OFFICE VISIT (OUTPATIENT)
Dept: OBGYN CLINIC | Age: 35
End: 2020-08-10
Payer: COMMERCIAL

## 2020-08-10 VITALS
DIASTOLIC BLOOD PRESSURE: 64 MMHG | HEIGHT: 67 IN | BODY MASS INDEX: 31.23 KG/M2 | SYSTOLIC BLOOD PRESSURE: 100 MMHG | WEIGHT: 199 LBS | TEMPERATURE: 98.4 F | HEART RATE: 106 BPM

## 2020-08-10 LAB
BILIRUBIN URINE: NEGATIVE
BLOOD, URINE: NEGATIVE
CLARITY: ABNORMAL
COLOR: YELLOW
CRL: NORMAL
GLUCOSE URINE: NEGATIVE MG/DL
KETONES, URINE: NEGATIVE MG/DL
LEUKOCYTE ESTERASE, URINE: ABNORMAL
MICROSCOPIC EXAMINATION: YES
NITRITE, URINE: NEGATIVE
PH UA: 7 (ref 5–8)
PROTEIN UA: NEGATIVE MG/DL
SAC DIAMETER: NORMAL
SPECIFIC GRAVITY UA: 1.02 (ref 1–1.03)
URINE TYPE: ABNORMAL
UROBILINOGEN, URINE: 0.2 E.U./DL

## 2020-08-10 PROCEDURE — 1036F TOBACCO NON-USER: CPT | Performed by: OBSTETRICS & GYNECOLOGY

## 2020-08-10 PROCEDURE — 81003 URINALYSIS AUTO W/O SCOPE: CPT | Performed by: OBSTETRICS & GYNECOLOGY

## 2020-08-10 PROCEDURE — G8427 DOCREV CUR MEDS BY ELIG CLIN: HCPCS | Performed by: OBSTETRICS & GYNECOLOGY

## 2020-08-10 PROCEDURE — G8417 CALC BMI ABV UP PARAM F/U: HCPCS | Performed by: OBSTETRICS & GYNECOLOGY

## 2020-08-10 PROCEDURE — 99204 OFFICE O/P NEW MOD 45 MIN: CPT | Performed by: OBSTETRICS & GYNECOLOGY

## 2020-08-10 PROCEDURE — 76801 OB US < 14 WKS SINGLE FETUS: CPT | Performed by: OBSTETRICS & GYNECOLOGY

## 2020-08-10 NOTE — PROGRESS NOTES
[facility]   Initial Prenatal Visit   CC:   Chief Complaint   Patient presents with    Initial Prenatal Visit     Unsure of LMP      HPI:  37 y.o. [id number] presents for her gynecologic annual exam. She complains of Amenorrhea. States her LMP was Patient's last menstrual period was 04/05/2020 (approximate). but is very unsure of this date -- surprised to be pregnant. Recently saw PCP who did Prenatal Labs. Has not had dating US. Denies any VB / Remonia Petite since that time. Some nausea/vomiting -- zofran sent to pharmacy. Also admits to some new headaches and food intolerances. Is taking a prenatal vitamin. Health Maintenance:  Safe Enviroment: y   - recently moved here from [address] [place] [address] retired     - partner still lives in [place]: y  Exercise: y  Contraception: none -- states she was not trying to get pregnant / was not using any contraception / she thought she was going through menopause. Screening:  Last pap smear: 2yrs ago -- believes it was normal   History of abnormal pap smears: denies  - records pending from previous [address] problem list has hx of ASCUS and LGSIL -- likely from previous pregnancy    STI History: denies     - hx of HSV 1    - DENIES hx of genital herpes     Vaccines:  Gardasil vaccine:   Flu vaccine:      Review of Systems:   Review of Systems   Constitutional: Negative for activity change, appetite change and fatigue. Eyes: Negative for photophobia and visual disturbance. Respiratory: Negative for shortness of breath. Cardiovascular: Negative for chest pain, palpitations and leg swelling. Gastrointestinal: Positive for nausea and vomiting. Negative for abdominal pain. Genitourinary: Positive for menstrual problem. Negative for difficulty urinating.        (+) Menstrual Problem; absence of Menses   (+) Pelvic Cramping; Mild    Skin: Negative for color change. Neurological: Positive for headaches.  Negative for dizziness and numbness. Hematological: Negative for adenopathy. Does not bruise/bleed easily. Psychiatric/Behavioral: Negative for behavioral problems. The patient is not nervous/anxious. 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 ... she is unsure of this date   Menstrual Period: regular   - until recently . . amenorrhea   Interval Between Menses: 30d  Duration of Menses: 3-4d  Menstrual Flow: normal  Bleeding between menses: denies     Sexual History:  Contraception: see above  Currently IS sexually active  Denies sexual problems    Medical History:  Past Medical History:   Diagnosis Date    Abnormal Pap smear of cervix     Anemia     Atypical squamous cells of undetermined significance (ASCUS) on Papanicolaou smear of cervix 2020    Depression     Low grade squamous intraepithelial cervical dysplasia affecting pregnancy, antepartum 2020    f/o ppartum    Overactive bladder     Suppressed lactation, delivered with postpartum condition 2020    Thyroid disease        Medications:  Current Outpatient Medications   Medication Sig Dispense Refill    ondansetron (ZOFRAN) 4 MG tablet Take 1 tablet by mouth every 8 hours as needed for Nausea 30 tablet 2    Prenatal Vit-Fe Fumarate-FA (PRENATAL LOW IRON) 27-0.8 MG TABS Take 1 tablet by mouth daily 90 tablet 1     No current facility-administered medications for this visit. Surgical History:  No past surgical history on file. Allergies: Allergies   Allergen Reactions    Ibuprofen Swelling       Family History:  Family History   Problem Relation Age of Onset    Diabetes Maternal Grandmother     Hypertension Maternal Grandmother     Glaucoma Maternal Grandmother        Denies personal/family history of cervical, uterine, ovarian, vulvar, breast, or colon cancers. Denies personal/family history of bleeding or clotting disorders  Denies personal/family history of genetic disorders    Social History:  Social History     Socioeconomic History    Marital status: Single     Spouse name: None    Number of children: None    Years of education: None    Highest education level: None   Occupational History    None   Social Needs    Financial resource strain: None    Food insecurity     Worry: None     Inability: None    Transportation needs     Medical: None     Non-medical: None   Tobacco Use    Smoking status: Never Smoker    Smokeless tobacco: Never Used   Substance and Sexual Activity    Alcohol use: Not Currently    Drug use: Never    Sexual activity: Yes   Lifestyle    Physical activity     Days per week: None     Minutes per session: None    Stress: None   Relationships    Social connections     Talks on phone: None     Gets together: None     Attends Mormon service: None     Active member of club or organization: None     Attends meetings of clubs or organizations: None     Relationship status: None    Intimate partner violence     Fear of current or ex partner: None     Emotionally abused: None     Physically abused: None     Forced sexual activity: None   Other Topics Concern    None   Social History Narrative    None       Objective: Body mass index is 31.17 kg/m².   /64 (Site: Left Upper Arm, Position: Sitting, Cuff Size: Medium Adult)   Pulse 106   Temp 98.4 °F (36.9 °C) (Temporal)   Ht 5' 7\" (1.702 m)   Wt 199 lb (90.3 kg)   LMP 04/05/2020 (Approximate)   BMI 31.17 kg/m²   General: Alert, well appearing, no acute distress  Head: Normocephalic, atraumatic  Mouth: Mucous membranes moist, pharynx normal without lesions  Thyroid: No thyromegaly or masses present   Breasts: Symmetric, non-tender to palpation, no skin changes, palpable axillary lymph nodes or masses noted  Lungs: Clear to auscultation bilaterally without rales, rhonchi, wheezing  CV: Regular rate and regular rhythm, normal S1, S2 without murmurs, rubs, clicks or gallops. Abdomen: Soft, nontender, nondistended   Pelvic:   External:  normal appearing genitalia without masses, tenderness or lesions  Vagina: moist, pink mucosa, without abnormal discharge or lesions  Cervix: no masses or lesions visualized, no cervical motion tenderness  Uterus: mobile, midline, no masses palpated  Adnexa: mobile, non-tender to palpation, without masses  Rectovaginal: deferred  Extremities: No redness or tenderness, neg Marissa's sign  Skin: Well perfused, normal coloration and turgor, no lesions or rashes visualized  Neuro: Alert, oriented, normal speech, no focal deficits, moves extremities appropriately  Osteopathic: No TART changes    Imaging:  OBSTETRIC ULTRASOUND--1ST TRIMESTER    DATE: 08/10/2020    PHYSICIAN: AARON Dash.     SONOGRAPHER: Pascual Ramírez RDMS    INDICATION: Amenorrhea    TYPE OF SCAN:  abdominal    FINDINGS:      The cul de sac is normal.  The cervix is normal.  The uterus is gravid. The uterus measures 13.58 cm x 10.60 cm x 9.03 cm. No uterine anomalies are evident. The right ovary is present. The right ovary measures 3.19 cm x 3.82 cm x 2.85 cm. The left ovary is present. The left ovary measures 3.84 cm x 4.34 cm x 2.45 cm. There is a single intrauterine pregnancy identified. A fetal pole is noted with a CRL measuring 6.33 cm, consistent with gestational age of 16 weeks and 5 days and EDC of 02/17/2021.   There is a 22 day discrepancy when compared with the gestational age of 22 weeks and 1 days and EDC of 01/10/2021 set by [facility] (04/05/2020). Fetal cardiac activity is present at 159 bpm.     IMPRESSION:    Single IUP with cardiac activity. EDC of 02/17/2021 based on 1st trimester US. Assessment/Plan:  37 y.o. [id number] presenting for her annual exam with (+) preg test:     Diagnosis Orders   1. Prenatal care, subsequent pregnancy in first trimester     2. Positive pregnancy test  Culture, Urine    Urinalysis   3. Pap smear for cervical cancer screening  PAP SMEAR   4. Possible exposure to STD  VAGINAL PATHOGENS PROBE *A    C.trachomatis N.gonorrhoeae DNA   5. Amenorrhea     6. Nausea and vomiting during pregnancy  ondansetron (ZOFRAN) 4 MG tablet   7. History of tobacco use        - Reviewed US results, consistent with viable IUP at 12w5d, [facility] [address] of 2/17/20 by [address]. - Zofran as needed for nausea   - prenatal labs reviewed  - needs UDS at next visit   - reviewed pregnancy packet and apt schedule   - pt undecided on genetic / carrier testing -- will discuss more at FU visit   - need records from [facility]   - return OB and COVID precautions reviewed    Follow Up  - Will call patient with results   -Return in about 4 weeks (around 9/7/2020) for Return OB visit, Ultrasound. Approximately 45 minutes spent in room counseling patient on condition and coordination of care with over 50% in direct face to face counseling.      Jaja Worthy DO

## 2020-08-11 LAB
BACTERIA: ABNORMAL /HPF
C TRACH DNA GENITAL QL NAA+PROBE: NEGATIVE
CANDIDA SPECIES, DNA PROBE: ABNORMAL
COMMENT UA: ABNORMAL
EPITHELIAL CELLS, UA: 5 /HPF (ref 0–5)
GARDNERELLA VAGINALIS, DNA PROBE: ABNORMAL
HYALINE CASTS: 6 /LPF (ref 0–8)
N. GONORRHOEAE DNA: NEGATIVE
ORGANISM: ABNORMAL
RBC UA: 1 /HPF (ref 0–4)
TRICHOMONAS VAGINALIS DNA: ABNORMAL
URINE CULTURE, ROUTINE: ABNORMAL
WBC UA: 2 /HPF (ref 0–5)

## 2020-08-12 LAB
HPV COMMENT: NORMAL
HPV TYPE 16: NOT DETECTED
HPV TYPE 18: NOT DETECTED
HPVOH (OTHER TYPES): NOT DETECTED

## 2020-08-13 PROBLEM — O92.5: Status: RESOLVED | Noted: 2020-08-05 | Resolved: 2020-08-13

## 2020-08-13 PROBLEM — R87.610 ATYPICAL SQUAMOUS CELLS OF UNDETERMINED SIGNIFICANCE (ASCUS) ON PAPANICOLAOU SMEAR OF CERVIX: Status: RESOLVED | Noted: 2020-08-05 | Resolved: 2020-08-13

## 2020-08-13 PROBLEM — R87.612 LOW GRADE SQUAMOUS INTRAEPITHELIAL CERVICAL DYSPLASIA AFFECTING PREGNANCY, ANTEPARTUM: Status: RESOLVED | Noted: 2020-08-05 | Resolved: 2020-08-13

## 2020-08-13 PROBLEM — O34.40 LOW GRADE SQUAMOUS INTRAEPITHELIAL CERVICAL DYSPLASIA AFFECTING PREGNANCY, ANTEPARTUM: Status: RESOLVED | Noted: 2020-08-05 | Resolved: 2020-08-13

## 2020-08-13 RX ORDER — ONDANSETRON 4 MG/1
4 TABLET, FILM COATED ORAL EVERY 8 HOURS PRN
Qty: 30 TABLET | Refills: 2 | Status: SHIPPED | OUTPATIENT
Start: 2020-08-13 | End: 2021-04-19 | Stop reason: ALTCHOICE

## 2020-08-13 ASSESSMENT — ENCOUNTER SYMPTOMS
COLOR CHANGE: 0
VOMITING: 1
PHOTOPHOBIA: 0
SHORTNESS OF BREATH: 0
ABDOMINAL PAIN: 0
NAUSEA: 1

## 2020-08-18 RX ORDER — NITROFURANTOIN 25; 75 MG/1; MG/1
100 CAPSULE ORAL 2 TIMES DAILY
Qty: 14 CAPSULE | Refills: 0 | Status: SHIPPED | OUTPATIENT
Start: 2020-08-18 | End: 2020-08-22

## 2020-08-19 ENCOUNTER — TELEPHONE (OUTPATIENT)
Dept: FAMILY MEDICINE CLINIC | Age: 35
End: 2020-08-19

## 2020-09-08 ENCOUNTER — ROUTINE PRENATAL (OUTPATIENT)
Dept: OBGYN CLINIC | Age: 35
End: 2020-09-08
Payer: COMMERCIAL

## 2020-09-08 VITALS
DIASTOLIC BLOOD PRESSURE: 50 MMHG | WEIGHT: 202.4 LBS | SYSTOLIC BLOOD PRESSURE: 102 MMHG | BODY MASS INDEX: 31.7 KG/M2 | HEART RATE: 100 BPM

## 2020-09-08 PROBLEM — O21.9 NAUSEA AND VOMITING OF PREGNANCY, ANTEPARTUM: Status: ACTIVE | Noted: 2020-09-08

## 2020-09-08 PROCEDURE — 99213 OFFICE O/P EST LOW 20 MIN: CPT | Performed by: OBSTETRICS & GYNECOLOGY

## 2020-09-08 PROCEDURE — G8427 DOCREV CUR MEDS BY ELIG CLIN: HCPCS | Performed by: OBSTETRICS & GYNECOLOGY

## 2020-09-08 PROCEDURE — 1036F TOBACCO NON-USER: CPT | Performed by: OBSTETRICS & GYNECOLOGY

## 2020-09-08 PROCEDURE — G8417 CALC BMI ABV UP PARAM F/U: HCPCS | Performed by: OBSTETRICS & GYNECOLOGY

## 2020-09-08 NOTE — PROGRESS NOTES
Temp.98.0F oral  Maternal emotional well being screening form completed and reviewed with patient. Current score is {NUMBER 12.    Patient given referral to [address] [place] [address] ([phone]):  NO

## 2020-09-09 LAB
AMPHETAMINE SCREEN, URINE: NORMAL
BARBITURATE SCREEN URINE: NORMAL
BENZODIAZEPINE SCREEN, URINE: NORMAL
BUPRENORPHINE URINE: NORMAL
CANNABINOID SCREEN URINE: NORMAL
COCAINE METABOLITE SCREEN URINE: NORMAL
Lab: NORMAL
METHADONE SCREEN, URINE: NORMAL
OPIATE SCREEN URINE: NORMAL
OXYCODONE URINE: NORMAL
PH UA: 7
PHENCYCLIDINE SCREEN URINE: NORMAL
PROPOXYPHENE SCREEN: NORMAL

## 2020-09-11 ENCOUNTER — ROUTINE PRENATAL (OUTPATIENT)
Dept: OBGYN CLINIC | Age: 35
End: 2020-09-11
Payer: COMMERCIAL

## 2020-09-11 VITALS
SYSTOLIC BLOOD PRESSURE: 110 MMHG | HEART RATE: 82 BPM | DIASTOLIC BLOOD PRESSURE: 68 MMHG | BODY MASS INDEX: 31.48 KG/M2 | WEIGHT: 201 LBS

## 2020-09-11 PROCEDURE — 1036F TOBACCO NON-USER: CPT | Performed by: OBSTETRICS & GYNECOLOGY

## 2020-09-11 PROCEDURE — G8427 DOCREV CUR MEDS BY ELIG CLIN: HCPCS | Performed by: OBSTETRICS & GYNECOLOGY

## 2020-09-11 PROCEDURE — 87210 SMEAR WET MOUNT SALINE/INK: CPT | Performed by: OBSTETRICS & GYNECOLOGY

## 2020-09-11 PROCEDURE — G8417 CALC BMI ABV UP PARAM F/U: HCPCS | Performed by: OBSTETRICS & GYNECOLOGY

## 2020-09-11 PROCEDURE — 99213 OFFICE O/P EST LOW 20 MIN: CPT | Performed by: OBSTETRICS & GYNECOLOGY

## 2020-09-11 RX ORDER — CEPHALEXIN 500 MG/1
500 CAPSULE ORAL 4 TIMES DAILY
Qty: 28 CAPSULE | Refills: 0 | Status: SHIPPED | OUTPATIENT
Start: 2020-09-11 | End: 2020-09-18

## 2020-09-11 RX ORDER — PRENATAL VIT/IRON FUM/FOLIC AC 27MG-0.8MG
1 TABLET ORAL DAILY
Qty: 90 TABLET | Refills: 1 | Status: SHIPPED | OUTPATIENT
Start: 2020-09-11 | End: 2020-11-06 | Stop reason: SDUPTHER

## 2020-09-11 RX ORDER — METRONIDAZOLE 7.5 MG/G
1 GEL VAGINAL DAILY
Qty: 1 TUBE | Refills: 0 | Status: SHIPPED | OUTPATIENT
Start: 2020-09-11 | End: 2020-09-16

## 2020-09-11 NOTE — PROGRESS NOTES
Temp- 98.2   Maternal emotional well being screening form completed and reviewed with patient. Current score is 9. Patient given referral to [address] [place] [address] ([phone]):  No

## 2020-09-11 NOTE — PROGRESS NOTES
Return OB Office Visit    CC:   Chief Complaint   Patient presents with    Urinary Tract Infection     frequency       HPI:  Pt seen and examined. No concerns/complaints. Denies VB, uterine cramps, +FM. Says she has been having a vaginal discharge --> fishy, yellow, thin. Also reporting urinary frequency, no malodorous urine, no dysuria, no hematuria. Feels like she has a lower abdominal pressure as well. Maternal wellness questionnaire reviewed - no concerns today. Score 9. Objective:  /68   Pulse 82   Wt 201 lb (91.2 kg)   LMP 2020 (Approximate)   BMI 31.48 kg/m²   Gen: AO, NAD  Abd: Soft, NT  FHT: 156  SSE: moderate amount of white discharge, wet mount +BV    Assessment/Plan:  37 y.o.  at 17w2d (Estimated Date of Delivery: 21) presents for dysuria and pelvic pressure    1. Urinary frequency  Send UA/culture, will treat empirically with keflex as well given previous +urine culture. Pyelo precautions reviewed. - URINALYSIS WITH MICROSCOPIC  - Culture, Urine    2. Vaginal discharge  +BV on wet mount, rx for metrogel sent to pharmacy.   - Wet prep, genital      Dispo: RTC in 4 weeks for scheduled follow-up  Dave Brandon MD

## 2020-09-12 LAB
BACTERIA: ABNORMAL /HPF
BILIRUBIN URINE: NEGATIVE
BLOOD, URINE: NEGATIVE
CLARITY: ABNORMAL
COLOR: YELLOW
EPITHELIAL CELLS, UA: ABNORMAL /HPF (ref 0–5)
GLUCOSE URINE: NEGATIVE MG/DL
KETONES, URINE: NEGATIVE MG/DL
LEUKOCYTE ESTERASE, URINE: ABNORMAL
MICROSCOPIC EXAMINATION: YES
NITRITE, URINE: NEGATIVE
PH UA: 7 (ref 5–8)
PROTEIN UA: NEGATIVE MG/DL
RBC UA: ABNORMAL /HPF (ref 0–4)
SPECIFIC GRAVITY UA: 1.02 (ref 1–1.03)
URINE TYPE: ABNORMAL
UROBILINOGEN, URINE: 0.2 E.U./DL
WBC UA: ABNORMAL /HPF (ref 0–5)

## 2020-09-13 LAB — URINE CULTURE, ROUTINE: NORMAL

## 2020-10-06 ENCOUNTER — TELEPHONE (OUTPATIENT)
Dept: OBGYN CLINIC | Age: 35
End: 2020-10-06

## 2020-10-07 ENCOUNTER — OFFICE VISIT (OUTPATIENT)
Dept: OBGYN CLINIC | Age: 35
End: 2020-10-07
Payer: COMMERCIAL

## 2020-10-07 ENCOUNTER — ROUTINE PRENATAL (OUTPATIENT)
Dept: OBGYN CLINIC | Age: 35
End: 2020-10-07
Payer: COMMERCIAL

## 2020-10-07 VITALS
WEIGHT: 205.13 LBS | SYSTOLIC BLOOD PRESSURE: 116 MMHG | HEART RATE: 98 BPM | BODY MASS INDEX: 32.13 KG/M2 | DIASTOLIC BLOOD PRESSURE: 72 MMHG

## 2020-10-07 LAB
ABDOMINAL CIRCUMFERENCE: NORMAL
BIPARIETAL DIAMETER: NORMAL
ESTIMATED FETAL WEIGHT: NORMAL
FEMORAL DIAMETER: NORMAL
HC/AC: NORMAL
HEAD CIRCUMFERENCE: NORMAL

## 2020-10-07 PROCEDURE — 76805 OB US >/= 14 WKS SNGL FETUS: CPT | Performed by: OBSTETRICS & GYNECOLOGY

## 2020-10-07 PROCEDURE — G8484 FLU IMMUNIZE NO ADMIN: HCPCS | Performed by: OBSTETRICS & GYNECOLOGY

## 2020-10-07 PROCEDURE — G8417 CALC BMI ABV UP PARAM F/U: HCPCS | Performed by: OBSTETRICS & GYNECOLOGY

## 2020-10-07 PROCEDURE — 1036F TOBACCO NON-USER: CPT | Performed by: OBSTETRICS & GYNECOLOGY

## 2020-10-07 PROCEDURE — G8427 DOCREV CUR MEDS BY ELIG CLIN: HCPCS | Performed by: OBSTETRICS & GYNECOLOGY

## 2020-10-07 PROCEDURE — 99213 OFFICE O/P EST LOW 20 MIN: CPT | Performed by: OBSTETRICS & GYNECOLOGY

## 2020-10-07 NOTE — PROGRESS NOTES
Return OB Office Visit    CC:   Chief Complaint   Patient presents with    Routine Prenatal Visit       HPI:  Pt seen and examined. No concerns/complaints. Denies VB, LOF, ctx. +FM. Maternal wellness questionnaire reviewed - no concerns today. Score 11. Objective:  /72   Pulse 98   Wt 205 lb 2 oz (93 kg)   LMP 04/05/2020 (Approximate)   BMI 32.13 kg/m²   Gen: AO, NAD  Abd: Soft, NT  FHT: 160    Ultrasound:  Impression    OBSTETRIC ULTRASOUND -- SECOND TRIMESTER         DATE:  10/07/2020         PHYSICIAN: Dipak Strong M.D.         SONOGRAPHER: ESTEVAN Berkowitz [facility]         INDICATION:  Second trimester, Anatomical screening         TYPE OF SCAN: vaginal, abdominal 3.5 MHz 5MHz         FINDINGS:           A single viable intrauterine pregnancy is noted in Breech presentation. Cardiac and somatic activity are noted.         The following values were obtained:    Fetal heart rate 160bpm    BPD 4.84cm 33.8 %    Head Circumference 18.49cm 70.0 %    Abdominal Circumference 16.81cm 37.2 %    Femur Length 3.46cm 37.2 %    Humerus Length 3.45cm 73.1 %    Cerebellum 2.11cm 27.4 %    Amniotic fluid DVP 3.81cm    EFW 415g 61.8 percentile         Subjective amniotic fluid volume is normal. Based on sonographic criteria, the estimated fetal age is 21weeks and 0days with EDC of 02/17/2021. Suann Pillar is a 0 day discordance with the established EDC of 02/17/2021.         The patient has a posterior placenta previa that is 0.50cm from internal os. The evaluation of the lower uterine segment and cervix reveals normal appearing anatomy. Transvaginal cervical length is 7.83cm with no funneling noted.  The uterus is unremarkable/gravid.  Maternal ovaries and adnexae are not well visualized due to the size of the uterus and patient's gravid state.         Normal Anatomy Seen:    4 chamber heart Spine CSP    Lateral ventricles               Umbilical arteries Cerebellum               Bladder Cisterna magna    Aortic arch Face Nuchal fold    Diaphragm Profile Upper extremities    Stomach             Lower extremities    ACI PCI Choroid plexus         Suboptimal anatomy seen: LVOT, RVOT, ductal arch, PCI, nose/lips, kidneys         Abnormal anatomy seen: placenta previa         The fetal genitalia is not well visualized.         IMPRESSION:    Single living IUP. No gross structural abnormalities are visualized. Amniotic fluid volume is subjectively normal. Posterior placenta previa.         The patient is well aware of the limitations of ultrasound in the detection of fetal anomalies.  The scan is limited by fetal position.               Assessment/Plan:  37 y.o. Noah Keep at 21w0d (Estimated Date of Delivery: 2/17/21) presents for [facility] appointment:      Diagnosis Orders   1. Prenatal care in second trimester     2. Placenta previa in second trimester     3. Fetal abnormality affecting management of mother, single or unspecified fetus     4. Adjustment disorder with mixed disturbance of emotions and conduct       Doing well overall today, routine prenatal care  - FWB reassuring by US today  - pt declines genetic testing  - Anatomy US 10/7 overall wnl although limited exam (see above) and placenta previa --> discussed recommendation for pelvic rest until reevaluation in 4 weeks.  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

## 2020-10-07 NOTE — LETTER
Patient: Itzel Alonzo   YOB: 1985   Date of Visit: 10/07/2020         Ramesh Trivedi is a patient at [place] [address]. Her due date is: 02/17/2021    Allergies:Ibuprofen    She may receive dental care to include:  Cleaning of teeth __x___     Antibiotics as needed (appropriate for pregnancy)__x___  Filing of cavities __x____  Pain medication as needed (appropriate for pregnancy) ____x__  Extraction with local anesthesia __x___   X-ray with abdominal protection __x___    If you have any questions or concerns, please don't hesitate to call the office.          Brigette Blount MD  [facility]  [address] [place] [address], [address]  O: [phone] D:[phone]

## 2020-10-08 PROBLEM — O44.02 PLACENTA PREVIA IN SECOND TRIMESTER: Status: ACTIVE | Noted: 2020-10-08

## 2020-10-08 PROBLEM — Z34.92 PRENATAL CARE IN SECOND TRIMESTER: Status: ACTIVE | Noted: 2020-10-08

## 2020-11-06 ENCOUNTER — ROUTINE PRENATAL (OUTPATIENT)
Dept: OBGYN CLINIC | Age: 35
End: 2020-11-06
Payer: COMMERCIAL

## 2020-11-06 ENCOUNTER — OFFICE VISIT (OUTPATIENT)
Dept: OBGYN CLINIC | Age: 35
End: 2020-11-06
Payer: COMMERCIAL

## 2020-11-06 VITALS
HEART RATE: 91 BPM | WEIGHT: 214.2 LBS | DIASTOLIC BLOOD PRESSURE: 72 MMHG | BODY MASS INDEX: 33.55 KG/M2 | SYSTOLIC BLOOD PRESSURE: 110 MMHG

## 2020-11-06 PROCEDURE — 76816 OB US FOLLOW-UP PER FETUS: CPT | Performed by: OBSTETRICS & GYNECOLOGY

## 2020-11-06 PROCEDURE — G8417 CALC BMI ABV UP PARAM F/U: HCPCS | Performed by: OBSTETRICS & GYNECOLOGY

## 2020-11-06 PROCEDURE — 99213 OFFICE O/P EST LOW 20 MIN: CPT | Performed by: OBSTETRICS & GYNECOLOGY

## 2020-11-06 PROCEDURE — 1036F TOBACCO NON-USER: CPT | Performed by: OBSTETRICS & GYNECOLOGY

## 2020-11-06 PROCEDURE — G8427 DOCREV CUR MEDS BY ELIG CLIN: HCPCS | Performed by: OBSTETRICS & GYNECOLOGY

## 2020-11-06 PROCEDURE — G8484 FLU IMMUNIZE NO ADMIN: HCPCS | Performed by: OBSTETRICS & GYNECOLOGY

## 2020-11-06 RX ORDER — PRENATAL VIT/IRON FUM/FOLIC AC 27MG-0.8MG
1 TABLET ORAL DAILY
Qty: 90 TABLET | Refills: 1 | Status: SHIPPED | OUTPATIENT
Start: 2020-11-06 | End: 2021-04-19 | Stop reason: ALTCHOICE

## 2020-11-06 NOTE — PROGRESS NOTES
Return OB Office Visit    CC:   Chief Complaint   Patient presents with    Routine Prenatal Visit       HPI:  Pt seen and examined. No concerns/complaints. Denies VB, LOF, ctx. +FM. Does continue to have some pelvic/vaginal pain. Has not tried anything for this pain yet. Otherwise doing well    Maternal wellness questionnaire reviewed - no concerns today. Score 14. States she is doing OK mentally, no SI/HI. Objective:  /72   Pulse 91   Wt 214 lb 3.2 oz (97.2 kg)   LMP 04/05/2020 (Approximate)   BMI 33.55 kg/m²   Gen: AO, NAD  Abd: Soft, NT  FHT: 149    Ultrasound:  Impression    OBSTETRICAL ULTRASOUND GROWTH         DATE: 11/6/20         PHYSICIAN: Chika Spencer M.D.         SONOGRAPHER: Ariana Calderón [facility]         INDICATION: Growth, placenta check, anatomy follow up         TYPE OF SCAN: vaginal, abdominal         FINDINGS:    A single viable intrauterine pregnancy is noted in Breech presentation. Cardiac and somatic activity are noted.         The following values were obtained:    Fetal heart rate 149bpm    BPD 6.10cm 25.1 %    Head Circumference 23.76cm 46.6 %    Abdominal Circumference 20.06cm 23.5 %    Femur Length 4.53cm 27.1 %    Humerus Length 4.17cm 36.0 %    Amniotic fluid index 14.95cm    EFW 749g 25.2 percentile         Amniotic fluid volume is normal. Based on sonographic criteria the estimated fetal age is 25weeks and 1days with EDC of 2/18/21. There is a 1 day discordance with the established EDC of 2/17/21.         The patient has a posterior placenta that is adequate distance (4.31cm) in relation to the internal cervical os.  The evaluation of the lower uterine segment and cervix reveals normal appearing anatomy.  The uterus is unremarkable/gravid.  Maternal ovaries and adnexae are not well visualized due to the size of the uterus and patient's gravid state.         Anatomy seen includes: 4 chamber heart, LVOT, RVOT, ductal arch, PCI, nose/lips, stomach, kidneys, bladder, gender (female)      IMPRESSION:    Single live IUP in the second trimester. Adequate interval fetal growth. Resoled placenta previa.         Imaging is limited secondary to fetal position. The patient is well aware of the limitations of ultrasound in the detection of anomalies.               Assessment/Plan:  37 y.o.  at 25w2d (Estimated Date of Delivery: 21) presents for [facility] appointment:      Diagnosis Orders   1. Prenatal care in second trimester     2. Placenta previa in second trimester     3. Fetal abnormality affecting management of mother, single or unspecified fetus     4.  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

## 2020-11-06 NOTE — PROGRESS NOTES
temp 97.3      Maternal emotional well being screening form completed and reviewed with patient. Current score is 14.

## 2020-11-12 ENCOUNTER — NURSE TRIAGE (OUTPATIENT)
Dept: OTHER | Facility: CLINIC | Age: 35
End: 2020-11-12

## 2020-11-12 NOTE — TELEPHONE ENCOUNTER
Reason for Disposition   Pregnant   Stiff neck (can't touch chin to chest)    Answer Assessment - Initial Assessment Questions  1. LOCATION: \"Where does it hurt? \"        On R side to back of neck     2. ONSET: \"When did the headache start? \" (Minutes, hours or days)          3 days ago     3. PATTERN: \"Does the pain come and go, or has it been constant since it started? \"          Constant     4. SEVERITY: \"How bad is the pain? \" and \"What does it keep you from doing? \"  (e.g., Scale 1-10; mild, moderate, or severe)    - MILD (1-3): doesn't interfere with normal activities     - MODERATE (4-7): interferes with normal activities or awakens from sleep     - SEVERE (8-10): excruciating pain, unable to do any normal activities                     9/10- cant sleep and has no relief with tylenol and heat     5. RECURRENT SYMPTOM: \"Have you ever had headaches before? \" If so, ask: \"When was the last time? \" and \"What happened that time? \"          Denies     6. CAUSE: \"What do you think is causing the headache? \"          Unsure     7. MIGRAINE: \"Have you been diagnosed with migraine headaches? \" If so, ask: \"Is this headache similar? \"           Denies    8. HEAD INJURY: \"Has there been any recent injury to the head? \"            Denies    9. OTHER SYMPTOMS: \"Do you have any other symptoms? \" (fever, stiff neck, eye pain, sore throat, cold symptoms)             Stiff neck, SOB     10. PREGNANCY: \"Is there any chance you are pregnant? \" \"When was your last menstrual period? \"              Currently 26 weeks    Protocols used: HEADACHE-ADULT-OH, PREGNANCY - HEADACHE-ADULT-OH    Patient called pre-service center [facility]  with red flag complaint. Brief description of triage: see above     Triage indicates for patient to go to ED now     Care advice provided, patient verbalizes understanding; denies any other questions or concerns; instructed to call back for any new or worsening symptoms. Attention Provider:  Thank you for allowing me to participate in the care of your patient. The patient was connected to triage in response to information provided to the ECC. Please do not respond through this encounter as the response is not directed to a shared pool.

## 2020-11-13 ENCOUNTER — TELEPHONE (OUTPATIENT)
Dept: OBGYN CLINIC | Age: 35
End: 2020-11-13

## 2020-11-13 NOTE — TELEPHONE ENCOUNTER
She can use up to 1000mg tylenol at a time for migraines. Also would recommend trying small amounts of caffeine to see if that helps. Encourage adequate hydration as well. Is she able to check her BP? If symptoms persist or BP elevated will need evaluation either in office of triage. Thanks.

## 2020-11-13 NOTE — TELEPHONE ENCOUNTER
Patient calling d/t she has had a migraine for 3 days now. She did take 1 tylenol d/t she was not sure if she could take it. Informed her that tylenol is safe to take in pregnancy. When she took the tylenol it did not help with the headache. This migraine is on the right side of her head and she is having some visual changes. She had an eye exam a few days ago and doesn't know if that would cause th migraine. Patient feels this is different from her normal migraine as her vision is usually not effected. Routing to Dr. Eugene Lala for advice.

## 2020-11-23 ENCOUNTER — ROUTINE PRENATAL (OUTPATIENT)
Dept: OBGYN CLINIC | Age: 35
End: 2020-11-23
Payer: COMMERCIAL

## 2020-11-23 VITALS
WEIGHT: 217.6 LBS | HEART RATE: 96 BPM | DIASTOLIC BLOOD PRESSURE: 70 MMHG | BODY MASS INDEX: 34.08 KG/M2 | SYSTOLIC BLOOD PRESSURE: 108 MMHG

## 2020-11-23 PROBLEM — O23.43 GBS (GROUP B STREPTOCOCCUS) UTI COMPLICATING PREGNANCY, THIRD TRIMESTER: Status: ACTIVE | Noted: 2020-11-23

## 2020-11-23 PROBLEM — Z87.891 HISTORY OF TOBACCO USE: Status: ACTIVE | Noted: 2020-11-23

## 2020-11-23 PROBLEM — O09.299 HX OF FETAL ANOMALY IN PRIOR PREGNANCY, CURRENTLY PREGNANT: Status: ACTIVE | Noted: 2020-11-23

## 2020-11-23 PROBLEM — B95.1 GBS (GROUP B STREPTOCOCCUS) UTI COMPLICATING PREGNANCY, THIRD TRIMESTER: Status: ACTIVE | Noted: 2020-11-23

## 2020-11-23 PROBLEM — O35.9XX0 FETAL ABNORMALITY AFFECTING MANAGEMENT OF MOTHER: Status: RESOLVED | Noted: 2020-08-05 | Resolved: 2020-11-23

## 2020-11-23 LAB
BASOPHILS ABSOLUTE: 0 K/UL (ref 0–0.2)
BASOPHILS RELATIVE PERCENT: 0.3 %
EOSINOPHILS ABSOLUTE: 0 K/UL (ref 0–0.6)
EOSINOPHILS RELATIVE PERCENT: 0.4 %
HCT VFR BLD CALC: 36.1 % (ref 36–48)
HEMOGLOBIN: 12.1 G/DL (ref 12–16)
LYMPHOCYTES ABSOLUTE: 1.6 K/UL (ref 1–5.1)
LYMPHOCYTES RELATIVE PERCENT: 18.6 %
MCH RBC QN AUTO: 30.9 PG (ref 26–34)
MCHC RBC AUTO-ENTMCNC: 33.6 G/DL (ref 31–36)
MCV RBC AUTO: 92 FL (ref 80–100)
MONOCYTES ABSOLUTE: 0.7 K/UL (ref 0–1.3)
MONOCYTES RELATIVE PERCENT: 8.7 %
NEUTROPHILS ABSOLUTE: 6.2 K/UL (ref 1.7–7.7)
NEUTROPHILS RELATIVE PERCENT: 72 %
PDW BLD-RTO: 14 % (ref 12.4–15.4)
PLATELET # BLD: 252 K/UL (ref 135–450)
PMV BLD AUTO: 8.3 FL (ref 5–10.5)
RBC # BLD: 3.93 M/UL (ref 4–5.2)
WBC # BLD: 8.6 K/UL (ref 4–11)

## 2020-11-23 PROCEDURE — 99214 OFFICE O/P EST MOD 30 MIN: CPT | Performed by: OBSTETRICS & GYNECOLOGY

## 2020-11-23 PROCEDURE — 1036F TOBACCO NON-USER: CPT | Performed by: OBSTETRICS & GYNECOLOGY

## 2020-11-23 PROCEDURE — G8484 FLU IMMUNIZE NO ADMIN: HCPCS | Performed by: OBSTETRICS & GYNECOLOGY

## 2020-11-23 PROCEDURE — G8427 DOCREV CUR MEDS BY ELIG CLIN: HCPCS | Performed by: OBSTETRICS & GYNECOLOGY

## 2020-11-23 PROCEDURE — G8417 CALC BMI ABV UP PARAM F/U: HCPCS | Performed by: OBSTETRICS & GYNECOLOGY

## 2020-11-23 PROCEDURE — 36415 COLL VENOUS BLD VENIPUNCTURE: CPT | Performed by: OBSTETRICS & GYNECOLOGY

## 2020-11-23 NOTE — PROGRESS NOTES
Temp 97.9    Maternal emotional well being screening form completed and reviewed with patient. Current score is 13. Patient started drink at 10:21am  and finished at 10:24  . Patient tolerated drink well.   1 green tube drawn at 11:25 . # 50 grams of Glucola. No complain of nausea and vomiting at this time, will continue to monitor.

## 2020-11-23 NOTE — PROGRESS NOTES
37 y.o. [id number] at 27w5d EGA Estimated Date of Delivery: 2/17/21 here for JOHANN:     Pt seen and examined. No concerns/complaints except for some vaginal discharge -- uses Lume deodorant and this has helped. Also admits to urinary incontinence -- needs to wear a pad daily -- encouraged to start doing kegel exercises. Denies VB, LOF, CTX +FM. MWQ reviewed (score: 13 - states she is stressed but stable, denies S/H/I). GCT today. Declines both FLU and TDAP. Objective:  /70   Pulse 96   Wt 217 lb 9.6 oz (98.7 kg)   LMP 04/05/2020 (Approximate)   BMI 34.08 kg/m²   Gen: AO, NAD  Abd: Soft, NT, gravid    FH 29 cm     bpm   Ext: Mild LE edema  OMM: Increased lumbar lordosis    Assessment/Plan:   Diagnosis Orders   1. Placenta previa in third trimester  CBC WITH AUTO DIFFERENTIAL    GLUCOSE CHALLENGE GESTATIONAL   2. Adjustment disorder with mixed disturbance of emotions and conduct     3. GBS (group b Streptococcus) UTI complicating pregnancy, third trimester     4. History of tobacco use     5. Hx of fetal anomaly in prior pregnancy, currently pregnant      Amniotic band syndrome      - Reassuring maternal / fetal status  - Offered counseling referral -- pt declines   - Encouraged Kegel exercises   - Plan for repeat growth US at 32, 36 weeks   - Pt desires repeat urine culture at next visit -- denies any symptoms other than those mentioned above   - Plan for tx of GBS on L/D  - Declines TDAP / FLU shot   - OB and COVID precautions reviewed     Follow Up  Return in about 2 weeks (around 12/7/2020) for Return OB visit.     Garrison Landon DO

## 2020-11-24 LAB — GLUCOSE CHALLENGE: 143 MG/DL

## 2020-12-07 ENCOUNTER — ROUTINE PRENATAL (OUTPATIENT)
Dept: OBGYN CLINIC | Age: 35
End: 2020-12-07
Payer: COMMERCIAL

## 2020-12-07 VITALS
HEART RATE: 86 BPM | WEIGHT: 220.4 LBS | BODY MASS INDEX: 34.52 KG/M2 | SYSTOLIC BLOOD PRESSURE: 114 MMHG | DIASTOLIC BLOOD PRESSURE: 68 MMHG

## 2020-12-07 PROCEDURE — G8484 FLU IMMUNIZE NO ADMIN: HCPCS | Performed by: OBSTETRICS & GYNECOLOGY

## 2020-12-07 PROCEDURE — G8427 DOCREV CUR MEDS BY ELIG CLIN: HCPCS | Performed by: OBSTETRICS & GYNECOLOGY

## 2020-12-07 PROCEDURE — 1036F TOBACCO NON-USER: CPT | Performed by: OBSTETRICS & GYNECOLOGY

## 2020-12-07 PROCEDURE — 99213 OFFICE O/P EST LOW 20 MIN: CPT | Performed by: OBSTETRICS & GYNECOLOGY

## 2020-12-07 PROCEDURE — G8417 CALC BMI ABV UP PARAM F/U: HCPCS | Performed by: OBSTETRICS & GYNECOLOGY

## 2020-12-07 PROCEDURE — 36415 COLL VENOUS BLD VENIPUNCTURE: CPT | Performed by: OBSTETRICS & GYNECOLOGY

## 2020-12-07 NOTE — PROGRESS NOTES
37 y.o. [id number] at 29w5d EGA Estimated Date of Delivery: 2/17/21 here for JOHANN:     Pt seen and examined. No concerns/complaints except for vaginal discharge -- would like to be screened for infection / STI today. Also admits to some round ligament pain -- using belly band with limited relief. Otherwise, denies VB, LOF, CTX +FM. MWQ reviewed (score: 8). Is completing her 3hr GTT today -- denies nausea / emesis. Objective:  /68   Pulse 86   Wt 220 lb 6.4 oz (100 kg)   LMP 04/05/2020 (Approximate)   BMI 34.52 kg/m²   Gen: AO, NAD  Abd: Soft, NT, gravid    FH 29     VE: physiologic discharge noted, swabs collected. Cervix closed. Negative CMT. Ext: Mild LE edema  OMM: Increased lumbar lordosis    Assessment/Plan:   Diagnosis Orders   1. Prenatal care, subsequent pregnancy in third trimester     2. Vaginal discharge  C.trachomatis N.gonorrhoeae DNA    VAGINAL PATHOGENS PROBE *A   3. Abnormal glucose affecting pregnancy  GLUCOSE TOLERANCE OBSTETRIC   4. Adjustment disorder with mixed disturbance of emotions and conduct     5. GBS (group b Streptococcus) UTI complicating pregnancy, third trimester     6. History of tobacco use     7. Hx of fetal anomaly in prior pregnancy, currently pregnant     8. BMI 31.0-31.9,adult     9. Round ligament pain        - reassuring maternal / fetal status  - GTT done today, results pending -- discussed next steps if fails 3hr including blood sugar testing / diabetic education   - vaginal swabs collected, pending   - reviewed RLP recommendations   - plan for repeat growth US at 32 and 36 wks   - plan to tx GBS on LD (no pcn allergy)  - Declines TDAP/FLu   - OB and COVID precautions reviewed     Follow Up  Return in about 2 weeks (around 12/21/2020) for Return OB visit.     Dona Lynch DO

## 2020-12-07 NOTE — PROGRESS NOTES
Temp- 97.2F infrared  Maternal emotional well being screening form completed and reviewed with patient. Current score is 8. Patient given referral to [address] [place] [address] ([phone]): No  Patient began drinking glucose 100G at 1052. Finished drinking at 1057. Tolerated drink well, no c/o n/v at this time. Will continue to monitor patient.

## 2020-12-08 LAB
CANDIDA SPECIES, DNA PROBE: ABNORMAL
GARDNERELLA VAGINALIS, DNA PROBE: ABNORMAL
GLUCOSE FASTING: 76 MG/DL
GLUCOSE TOLERANCE TEST 1 HOUR: 146 MG/DL
GLUCOSE TOLERANCE TEST 2 HOUR: 136 MG/DL
GLUCOSE TOLERANCE TEST 3 HOUR: 125 MG/DL
TRICHOMONAS VAGINALIS DNA: ABNORMAL

## 2020-12-09 LAB
C TRACH DNA GENITAL QL NAA+PROBE: NEGATIVE
N. GONORRHOEAE DNA: NEGATIVE

## 2020-12-21 ENCOUNTER — TELEPHONE (OUTPATIENT)
Dept: OBGYN CLINIC | Age: 35
End: 2020-12-21

## 2020-12-21 NOTE — TELEPHONE ENCOUNTER
Called patient to confirm appointment patient stated that she thought this appointment has an Ultrasound and if it didn't she wasn't coming in. She said that she has her kids and if there is no US she isnt coming. I explained to patient that she will need to be seen every two weeks until she hits 36 weeks and then we will see her every two weeks. I attempted to reschedule patient for later this week or early next and she said she couldn't make an appointment as she has her daughter. I told patient that I would send a message to the physician but that she would need to call us back when she figured out  so we could see her.

## 2020-12-21 NOTE — TELEPHONE ENCOUNTER
Aware.   Yes she will need to be seen q 2 weeks until 36 wk, then weekly till delivery. I will check when her next US is due.      Marquis

## 2021-01-18 ENCOUNTER — ROUTINE PRENATAL (OUTPATIENT)
Dept: OBGYN CLINIC | Age: 36
End: 2021-01-18
Payer: COMMERCIAL

## 2021-01-18 VITALS
BODY MASS INDEX: 36.1 KG/M2 | DIASTOLIC BLOOD PRESSURE: 60 MMHG | SYSTOLIC BLOOD PRESSURE: 96 MMHG | HEART RATE: 100 BPM | WEIGHT: 230.5 LBS

## 2021-01-18 DIAGNOSIS — Z91.199 NONCOMPLIANCE: ICD-10-CM

## 2021-01-18 DIAGNOSIS — Z34.83 PRENATAL CARE, SUBSEQUENT PREGNANCY IN THIRD TRIMESTER: Primary | ICD-10-CM

## 2021-01-18 DIAGNOSIS — F43.25 ADJUSTMENT DISORDER WITH MIXED DISTURBANCE OF EMOTIONS AND CONDUCT: ICD-10-CM

## 2021-01-18 DIAGNOSIS — O23.43 GBS (GROUP B STREPTOCOCCUS) UTI COMPLICATING PREGNANCY, THIRD TRIMESTER: ICD-10-CM

## 2021-01-18 DIAGNOSIS — Z87.891 HISTORY OF TOBACCO USE: ICD-10-CM

## 2021-01-18 DIAGNOSIS — O09.299 HX OF FETAL ANOMALY IN PRIOR PREGNANCY, CURRENTLY PREGNANT: ICD-10-CM

## 2021-01-18 DIAGNOSIS — B95.1 GBS (GROUP B STREPTOCOCCUS) UTI COMPLICATING PREGNANCY, THIRD TRIMESTER: ICD-10-CM

## 2021-01-18 DIAGNOSIS — O44.03 PLACENTA PREVIA IN THIRD TRIMESTER: ICD-10-CM

## 2021-01-18 PROCEDURE — G8428 CUR MEDS NOT DOCUMENT: HCPCS | Performed by: OBSTETRICS & GYNECOLOGY

## 2021-01-18 PROCEDURE — G8484 FLU IMMUNIZE NO ADMIN: HCPCS | Performed by: OBSTETRICS & GYNECOLOGY

## 2021-01-18 PROCEDURE — 1036F TOBACCO NON-USER: CPT | Performed by: OBSTETRICS & GYNECOLOGY

## 2021-01-18 PROCEDURE — G8417 CALC BMI ABV UP PARAM F/U: HCPCS | Performed by: OBSTETRICS & GYNECOLOGY

## 2021-01-18 PROCEDURE — 99213 OFFICE O/P EST LOW 20 MIN: CPT | Performed by: OBSTETRICS & GYNECOLOGY

## 2021-01-18 NOTE — PROGRESS NOTES
37 y.o. [id number] at 35w5d EGA Estimated Date of Delivery: 2/17/21 here for JOHANN:     Pt seen and examined. Pt has not been seen since 28wks despite explanation of apt schedule in pregnancy. States she has been [place]" and \"stressed with family\" thus unable to make her appointments. No concerns/complaints except for vaginal discharge -- only started metro-gel last week. Denies VB, LOF, CTX +FM. MWQ reviewed (score: 4). Objective:  BP 96/60   Pulse 100   Wt 230 lb 8 oz (104.6 kg)   LMP 04/05/2020 (Approximate)   BMI 36.10 kg/m²   Gen: AO, NAD  Abd: Soft, NT, gravid    FH 38    bpm  VE: posterior, closed / 30 / -3   Ext: Mild LE edema  OMM: Increased lumbar lordosis    Assessment/Plan:   Diagnosis Orders   1. Prenatal care, subsequent pregnancy in third trimester  Culture, Strep B Screen, Vaginal/Rectal   2. GBS (group b Streptococcus) UTI complicating pregnancy, third trimester     3. History of tobacco use     4. Hx of fetal anomaly in prior pregnancy, currently pregnant     5. BMI 31.0-31.9,adult     6. Placenta previa in third trimester (RESOLVED)     7. Noncompliance     8. Adjustment disorder with mixed disturbance of emotions and conduct        - reassuring maternal / fetal status today   - GTT normal    - plan for growth US at next visit  - previa resolved on 11/6/20     - plan to tx GBS on LD (no pcn allergy) due to UTI  - Declines TDAP/FLu   - OB and COVID precautions reviewed   - also reviewed importance of keeping prenatal apts     Follow Up  Return in about 1 week (around 1/25/2021) for Return OB visit, Ultrasound (Growth).     Dustin Munguia, DO

## 2021-01-20 LAB
GROUP B STREP CULTURE: ABNORMAL
ORGANISM: ABNORMAL

## 2021-01-22 ENCOUNTER — TELEPHONE (OUTPATIENT)
Dept: OBGYN CLINIC | Age: 36
End: 2021-01-22

## 2021-01-22 NOTE — TELEPHONE ENCOUNTER
Patient called and is asking if she is to have an ultrasound at her appt on Monday?       Routing to Dr. Alicia Isaacs

## 2021-01-25 ENCOUNTER — OFFICE VISIT (OUTPATIENT)
Dept: OBGYN CLINIC | Age: 36
End: 2021-01-25
Payer: COMMERCIAL

## 2021-01-25 ENCOUNTER — ROUTINE PRENATAL (OUTPATIENT)
Dept: OBGYN CLINIC | Age: 36
End: 2021-01-25
Payer: COMMERCIAL

## 2021-01-25 VITALS
SYSTOLIC BLOOD PRESSURE: 130 MMHG | DIASTOLIC BLOOD PRESSURE: 84 MMHG | WEIGHT: 227.2 LBS | HEART RATE: 101 BPM | BODY MASS INDEX: 35.58 KG/M2

## 2021-01-25 DIAGNOSIS — B95.1 GBS (GROUP B STREPTOCOCCUS) UTI COMPLICATING PREGNANCY, THIRD TRIMESTER: ICD-10-CM

## 2021-01-25 DIAGNOSIS — O09.299 HX OF FETAL ANOMALY IN PRIOR PREGNANCY, CURRENTLY PREGNANT: ICD-10-CM

## 2021-01-25 DIAGNOSIS — Z87.891 HISTORY OF TOBACCO USE: ICD-10-CM

## 2021-01-25 DIAGNOSIS — Z34.83 PRENATAL CARE, SUBSEQUENT PREGNANCY IN THIRD TRIMESTER: Primary | ICD-10-CM

## 2021-01-25 DIAGNOSIS — F43.25 ADJUSTMENT DISORDER WITH MIXED DISTURBANCE OF EMOTIONS AND CONDUCT: ICD-10-CM

## 2021-01-25 DIAGNOSIS — O44.03 PLACENTA PREVIA IN THIRD TRIMESTER: ICD-10-CM

## 2021-01-25 DIAGNOSIS — O23.43 GBS (GROUP B STREPTOCOCCUS) UTI COMPLICATING PREGNANCY, THIRD TRIMESTER: ICD-10-CM

## 2021-01-25 DIAGNOSIS — Z34.93 PRENATAL CARE, THIRD TRIMESTER: Primary | ICD-10-CM

## 2021-01-25 DIAGNOSIS — Z91.199 NONCOMPLIANCE: ICD-10-CM

## 2021-01-25 PROCEDURE — 1036F TOBACCO NON-USER: CPT | Performed by: OBSTETRICS & GYNECOLOGY

## 2021-01-25 PROCEDURE — G8417 CALC BMI ABV UP PARAM F/U: HCPCS | Performed by: OBSTETRICS & GYNECOLOGY

## 2021-01-25 PROCEDURE — G8427 DOCREV CUR MEDS BY ELIG CLIN: HCPCS | Performed by: OBSTETRICS & GYNECOLOGY

## 2021-01-25 PROCEDURE — 99213 OFFICE O/P EST LOW 20 MIN: CPT | Performed by: OBSTETRICS & GYNECOLOGY

## 2021-01-25 PROCEDURE — G8484 FLU IMMUNIZE NO ADMIN: HCPCS | Performed by: OBSTETRICS & GYNECOLOGY

## 2021-01-25 PROCEDURE — 76816 OB US FOLLOW-UP PER FETUS: CPT | Performed by: OBSTETRICS & GYNECOLOGY

## 2021-01-25 NOTE — PROGRESS NOTES
Temp 97.2 Oral F    Maternal emotional well being screening form completed and reviewed with patient. Current score is 9.

## 2021-01-25 NOTE — PROGRESS NOTES
37 y.o. [id number] at 36w5d EGA Estimated Date of Delivery: 2/17/21 here for JOHANN:     Pt seen and examined. No concerns/complaints except for cramping and very occasional lisette paz. Denies VB, LOF, CTX +FM. MWQ reviewed (score: 9 - states her \"family is driving her nuts\"). Objective:  /84   Pulse 101   Wt 227 lb 3.2 oz (103.1 kg)   LMP 04/05/2020 (Approximate)   BMI 35.58 kg/m²   Gen: AO, NAD  Abd: Soft, NT, gravid    FH     bpm   VE: recheck next week   Ext: Mild LE edema  OMM: Increased lumbar lordosis    Images  OBSTETRICAL ULTRASOUND GROWTH    DATE: 1/25/21    PHYSICIAN: AARON Packer.     SONOGRAPHER: Rocio Chaney RDMS    INDICATION: Growth    TYPE OF SCAN: abdominal    FINDINGS:  A single viable intrauterine pregnancy is noted in cephalic presentation. Cardiac and somatic activity are noted. The following values were obtained:   Fetal heart rate    150bpm   BPD      8.64cm  14.8 %   Head Circumference    31.93cm 10.5 %    Abdominal Circumference   33.57cm 81.7 %   Femur Length     6.98cm  24.8 %   Amniotic fluid index    12.16cm   EFW      2981g  51.5 percentile    Amniotic fluid volume is normal. Based on sonographic criteria the estimated fetal age is 36weeks and 0days with EDC of 2/22/21. There is a 5 day discordance with the established EDC of 2/17/21. The patient has a posterior placenta that is adequate distance in relation to the internal cervical os. The evaluation of the lower uterine segment and cervix reveals normal appearing anatomy. The uterus is unremarkable/gravid. Maternal ovaries and adnexae are not well visualized due to the size of the uterus and patient's gravid state. Anatomy seen includes: heart, stomach, kidneys, bladder    IMPRESSION:  Single live IUP in the third trimester. Adequate interval fetal growth. Assessment/Plan:   Diagnosis Orders   1. Prenatal care, subsequent pregnancy in third trimester     2.  GBS (group b Streptococcus) UTI complicating pregnancy, third trimester     3. History of tobacco use     4. Hx of fetal anomaly in prior pregnancy, currently pregnant     5. BMI 31.0-31.9,adult     6. Placenta previa in third trimester (RESOLVED)     7. Noncompliance     8. Adjustment disorder with mixed disturbance of emotions and conduct        - reassuring maternal / fetal status today   - GTT normal    - Growth US 1/25/21: EFW 51.5%tile, PRADEEP 12.16cm,  bpm, Vertex   - previa resolved on 11/6/20     - plan to tx GBS on LD (no pcn allergy)  - Declines TDAP/Flu   - OB and COVID precautions reviewed   - Interested in IOL > 39+0 EGA (around 2/10/21)   - also reviewed importance of keeping prenatal apts     Follow Up  Return in about 1 week (around 2/1/2021) for Return OB visit.     Julieth Ye, DO

## 2021-02-09 ENCOUNTER — ANESTHESIA (OUTPATIENT)
Dept: LABOR AND DELIVERY | Age: 36
DRG: 560 | End: 2021-02-09
Payer: COMMERCIAL

## 2021-02-09 ENCOUNTER — HOSPITAL ENCOUNTER (INPATIENT)
Age: 36
LOS: 2 days | Discharge: HOME | DRG: 560 | End: 2021-02-11
Attending: OBSTETRICS & GYNECOLOGY | Admitting: OBSTETRICS & GYNECOLOGY
Payer: COMMERCIAL

## 2021-02-09 ENCOUNTER — ANESTHESIA EVENT (OUTPATIENT)
Dept: LABOR AND DELIVERY | Age: 36
DRG: 560 | End: 2021-02-09
Payer: COMMERCIAL

## 2021-02-09 PROBLEM — Z37.9 NORMAL LABOR: Status: ACTIVE | Noted: 2021-02-09

## 2021-02-09 LAB
ABO/RH: NORMAL
AMPHETAMINE SCREEN, URINE: NORMAL
ANTIBODY SCREEN: NORMAL
ANTIBODY SCREEN: NORMAL
BARBITURATE SCREEN URINE: NORMAL
BASOPHILS ABSOLUTE: 0 K/UL (ref 0–0.2)
BASOPHILS RELATIVE PERCENT: 0.5 %
BENZODIAZEPINE SCREEN, URINE: NORMAL
BUPRENORPHINE URINE: NORMAL
CANNABINOID SCREEN URINE: NORMAL
COCAINE METABOLITE SCREEN URINE: NORMAL
EOSINOPHILS ABSOLUTE: 0 K/UL (ref 0–0.6)
EOSINOPHILS RELATIVE PERCENT: 0.4 %
HCT VFR BLD CALC: 38.7 % (ref 36–48)
HEMOGLOBIN: 12.7 G/DL (ref 12–16)
HEPATITIS B SURFACE ANTIGEN INTERPRETATION: NORMAL
LYMPHOCYTES ABSOLUTE: 2.2 K/UL (ref 1–5.1)
LYMPHOCYTES RELATIVE PERCENT: 21.7 %
Lab: NORMAL
MCH RBC QN AUTO: 29.1 PG (ref 26–34)
MCHC RBC AUTO-ENTMCNC: 33 G/DL (ref 31–36)
MCV RBC AUTO: 88.2 FL (ref 80–100)
METHADONE SCREEN, URINE: NORMAL
MONOCYTES ABSOLUTE: 1.2 K/UL (ref 0–1.3)
MONOCYTES RELATIVE PERCENT: 12.2 %
NEUTROPHILS ABSOLUTE: 6.7 K/UL (ref 1.7–7.7)
NEUTROPHILS RELATIVE PERCENT: 65.2 %
OPIATE SCREEN URINE: NORMAL
OXYCODONE URINE: NORMAL
PDW BLD-RTO: 14.7 % (ref 12.4–15.4)
PH UA: 7
PHENCYCLIDINE SCREEN URINE: NORMAL
PLATELET # BLD: 265 K/UL (ref 135–450)
PMV BLD AUTO: 7.9 FL (ref 5–10.5)
PROPOXYPHENE SCREEN: NORMAL
RBC # BLD: 4.39 M/UL (ref 4–5.2)
SARS-COV-2, NAAT: NOT DETECTED
TOTAL SYPHILLIS IGG/IGM: NORMAL
WBC # BLD: 10.2 K/UL (ref 4–11)

## 2021-02-09 PROCEDURE — 3700000025 EPIDURAL BLOCK: Performed by: ANESTHESIOLOGY

## 2021-02-09 PROCEDURE — 85025 COMPLETE CBC W/AUTO DIFF WBC: CPT

## 2021-02-09 PROCEDURE — 2580000003 HC RX 258: Performed by: OBSTETRICS & GYNECOLOGY

## 2021-02-09 PROCEDURE — 87340 HEPATITIS B SURFACE AG IA: CPT

## 2021-02-09 PROCEDURE — 2500000003 HC RX 250 WO HCPCS: Performed by: NURSE ANESTHETIST, CERTIFIED REGISTERED

## 2021-02-09 PROCEDURE — 86900 BLOOD TYPING SEROLOGIC ABO: CPT

## 2021-02-09 PROCEDURE — 86780 TREPONEMA PALLIDUM: CPT

## 2021-02-09 PROCEDURE — 80307 DRUG TEST PRSMV CHEM ANLYZR: CPT

## 2021-02-09 PROCEDURE — 86850 RBC ANTIBODY SCREEN: CPT

## 2021-02-09 PROCEDURE — 6360000002 HC RX W HCPCS: Performed by: OBSTETRICS & GYNECOLOGY

## 2021-02-09 PROCEDURE — U0002 COVID-19 LAB TEST NON-CDC: HCPCS

## 2021-02-09 PROCEDURE — 6370000000 HC RX 637 (ALT 250 FOR IP): Performed by: OBSTETRICS & GYNECOLOGY

## 2021-02-09 PROCEDURE — 86901 BLOOD TYPING SEROLOGIC RH(D): CPT

## 2021-02-09 PROCEDURE — 1220000000 HC SEMI PRIVATE OB R&B

## 2021-02-09 PROCEDURE — 7200000001 HC VAGINAL DELIVERY

## 2021-02-09 PROCEDURE — 51701 INSERT BLADDER CATHETER: CPT

## 2021-02-09 PROCEDURE — 59409 OBSTETRICAL CARE: CPT | Performed by: OBSTETRICS & GYNECOLOGY

## 2021-02-09 RX ORDER — SODIUM CHLORIDE, SODIUM LACTATE, POTASSIUM CHLORIDE, CALCIUM CHLORIDE 600; 310; 30; 20 MG/100ML; MG/100ML; MG/100ML; MG/100ML
INJECTION, SOLUTION INTRAVENOUS CONTINUOUS
Status: DISCONTINUED | OUTPATIENT
Start: 2021-02-09 | End: 2021-02-09

## 2021-02-09 RX ORDER — SODIUM CHLORIDE, SODIUM LACTATE, POTASSIUM CHLORIDE, CALCIUM CHLORIDE 600; 310; 30; 20 MG/100ML; MG/100ML; MG/100ML; MG/100ML
INJECTION, SOLUTION INTRAVENOUS CONTINUOUS
Status: DISCONTINUED | OUTPATIENT
Start: 2021-02-09 | End: 2021-02-11 | Stop reason: HOSPADM

## 2021-02-09 RX ORDER — DOCUSATE SODIUM 100 MG/1
100 CAPSULE, LIQUID FILLED ORAL 2 TIMES DAILY
Status: DISCONTINUED | OUTPATIENT
Start: 2021-02-09 | End: 2021-02-11 | Stop reason: HOSPADM

## 2021-02-09 RX ORDER — FERROUS SULFATE 325(65) MG
325 TABLET ORAL
Status: DISCONTINUED | OUTPATIENT
Start: 2021-02-10 | End: 2021-02-11 | Stop reason: HOSPADM

## 2021-02-09 RX ORDER — ONDANSETRON 4 MG/1
4 TABLET, ORALLY DISINTEGRATING ORAL EVERY 8 HOURS PRN
Status: DISCONTINUED | OUTPATIENT
Start: 2021-02-09 | End: 2021-02-11 | Stop reason: HOSPADM

## 2021-02-09 RX ORDER — ONDANSETRON 2 MG/ML
4 INJECTION INTRAMUSCULAR; INTRAVENOUS EVERY 6 HOURS PRN
Status: DISCONTINUED | OUTPATIENT
Start: 2021-02-09 | End: 2021-02-09

## 2021-02-09 RX ORDER — HYDROCODONE BITARTRATE AND ACETAMINOPHEN 5; 325 MG/1; MG/1
1 TABLET ORAL EVERY 4 HOURS PRN
Status: DISCONTINUED | OUTPATIENT
Start: 2021-02-09 | End: 2021-02-11 | Stop reason: HOSPADM

## 2021-02-09 RX ORDER — SODIUM CHLORIDE 0.9 % (FLUSH) 0.9 %
10 SYRINGE (ML) INJECTION PRN
Status: DISCONTINUED | OUTPATIENT
Start: 2021-02-09 | End: 2021-02-11 | Stop reason: HOSPADM

## 2021-02-09 RX ORDER — ACETAMINOPHEN 325 MG/1
650 TABLET ORAL EVERY 4 HOURS PRN
Status: DISCONTINUED | OUTPATIENT
Start: 2021-02-09 | End: 2021-02-09

## 2021-02-09 RX ORDER — SIMETHICONE 80 MG
80 TABLET,CHEWABLE ORAL 4 TIMES DAILY PRN
Status: DISCONTINUED | OUTPATIENT
Start: 2021-02-09 | End: 2021-02-11 | Stop reason: HOSPADM

## 2021-02-09 RX ORDER — PENICILLIN G POTASSIUM 5000000 [IU]/1
INJECTION, POWDER, FOR SOLUTION INTRAMUSCULAR; INTRAVENOUS
Status: DISCONTINUED
Start: 2021-02-09 | End: 2021-02-09

## 2021-02-09 RX ORDER — DOCUSATE SODIUM 100 MG/1
100 CAPSULE, LIQUID FILLED ORAL 2 TIMES DAILY
Status: DISCONTINUED | OUTPATIENT
Start: 2021-02-09 | End: 2021-02-09

## 2021-02-09 RX ORDER — SODIUM CHLORIDE 0.9 % (FLUSH) 0.9 %
10 SYRINGE (ML) INJECTION EVERY 12 HOURS SCHEDULED
Status: DISCONTINUED | OUTPATIENT
Start: 2021-02-09 | End: 2021-02-11 | Stop reason: HOSPADM

## 2021-02-09 RX ORDER — SIMETHICONE 80 MG
80 TABLET,CHEWABLE ORAL EVERY 6 HOURS PRN
Status: DISCONTINUED | OUTPATIENT
Start: 2021-02-09 | End: 2021-02-09

## 2021-02-09 RX ORDER — BUPIVACAINE HYDROCHLORIDE 5 MG/ML
INJECTION, SOLUTION EPIDURAL; INTRACAUDAL PRN
Status: DISCONTINUED | OUTPATIENT
Start: 2021-02-09 | End: 2021-02-09 | Stop reason: SDUPTHER

## 2021-02-09 RX ORDER — HYDROCODONE BITARTRATE AND ACETAMINOPHEN 5; 325 MG/1; MG/1
1 TABLET ORAL EVERY 4 HOURS PRN
Status: DISCONTINUED | OUTPATIENT
Start: 2021-02-09 | End: 2021-02-09

## 2021-02-09 RX ORDER — HYDROCODONE BITARTRATE AND ACETAMINOPHEN 5; 325 MG/1; MG/1
2 TABLET ORAL EVERY 4 HOURS PRN
Status: DISCONTINUED | OUTPATIENT
Start: 2021-02-09 | End: 2021-02-11 | Stop reason: HOSPADM

## 2021-02-09 RX ORDER — TERBUTALINE SULFATE 1 MG/ML
0.25 INJECTION, SOLUTION SUBCUTANEOUS ONCE
Status: DISCONTINUED | OUTPATIENT
Start: 2021-02-09 | End: 2021-02-09

## 2021-02-09 RX ORDER — ACETAMINOPHEN 325 MG/1
650 TABLET ORAL EVERY 4 HOURS PRN
Status: DISCONTINUED | OUTPATIENT
Start: 2021-02-09 | End: 2021-02-11 | Stop reason: HOSPADM

## 2021-02-09 RX ORDER — DIPHENHYDRAMINE HYDROCHLORIDE 50 MG/ML
25 INJECTION INTRAMUSCULAR; INTRAVENOUS EVERY 4 HOURS PRN
Status: DISCONTINUED | OUTPATIENT
Start: 2021-02-09 | End: 2021-02-09

## 2021-02-09 RX ORDER — SODIUM CHLORIDE 0.9 % (FLUSH) 0.9 %
10 SYRINGE (ML) INJECTION EVERY 12 HOURS SCHEDULED
Status: DISCONTINUED | OUTPATIENT
Start: 2021-02-09 | End: 2021-02-09

## 2021-02-09 RX ORDER — SODIUM CHLORIDE 0.9 % (FLUSH) 0.9 %
10 SYRINGE (ML) INJECTION PRN
Status: DISCONTINUED | OUTPATIENT
Start: 2021-02-09 | End: 2021-02-09

## 2021-02-09 RX ORDER — LIDOCAINE HYDROCHLORIDE 10 MG/ML
30 INJECTION, SOLUTION EPIDURAL; INFILTRATION; INTRACAUDAL; PERINEURAL PRN
Status: DISCONTINUED | OUTPATIENT
Start: 2021-02-09 | End: 2021-02-09

## 2021-02-09 RX ORDER — LANOLIN 100 %
OINTMENT (GRAM) TOPICAL PRN
Status: DISCONTINUED | OUTPATIENT
Start: 2021-02-09 | End: 2021-02-11 | Stop reason: HOSPADM

## 2021-02-09 RX ORDER — HYDROCODONE BITARTRATE AND ACETAMINOPHEN 5; 325 MG/1; MG/1
2 TABLET ORAL EVERY 4 HOURS PRN
Status: DISCONTINUED | OUTPATIENT
Start: 2021-02-09 | End: 2021-02-09

## 2021-02-09 RX ADMIN — WITCH HAZEL 40 EACH: 500 SOLUTION RECTAL; TOPICAL at 12:05

## 2021-02-09 RX ADMIN — DOCUSATE SODIUM 100 MG: 100 CAPSULE, LIQUID FILLED ORAL at 20:40

## 2021-02-09 RX ADMIN — SODIUM CHLORIDE, POTASSIUM CHLORIDE, SODIUM LACTATE AND CALCIUM CHLORIDE: 600; 310; 30; 20 INJECTION, SOLUTION INTRAVENOUS at 08:45

## 2021-02-09 RX ADMIN — HYDROCODONE BITARTRATE AND ACETAMINOPHEN 2 TABLET: 5; 325 TABLET ORAL at 20:40

## 2021-02-09 RX ADMIN — SODIUM CHLORIDE, POTASSIUM CHLORIDE, SODIUM LACTATE AND CALCIUM CHLORIDE: 600; 310; 30; 20 INJECTION, SOLUTION INTRAVENOUS at 09:16

## 2021-02-09 RX ADMIN — SODIUM CHLORIDE, POTASSIUM CHLORIDE, SODIUM LACTATE AND CALCIUM CHLORIDE: 600; 310; 30; 20 INJECTION, SOLUTION INTRAVENOUS at 08:11

## 2021-02-09 RX ADMIN — DEXTROSE MONOHYDRATE 5 MILLION UNITS: 5 INJECTION INTRAVENOUS at 08:15

## 2021-02-09 RX ADMIN — HYDROCODONE BITARTRATE AND ACETAMINOPHEN 1 TABLET: 5; 325 TABLET ORAL at 16:05

## 2021-02-09 RX ADMIN — BENZOCAINE AND LEVOMENTHOL: 200; 5 SPRAY TOPICAL at 12:05

## 2021-02-09 RX ADMIN — HYDROCODONE BITARTRATE AND ACETAMINOPHEN 1 TABLET: 5; 325 TABLET ORAL at 12:04

## 2021-02-09 RX ADMIN — BUPIVACAINE HYDROCHLORIDE 1.5 ML: 5 INJECTION, SOLUTION EPIDURAL; INTRACAUDAL; PERINEURAL at 08:35

## 2021-02-09 ASSESSMENT — PAIN DESCRIPTION - DESCRIPTORS: DESCRIPTORS: CRAMPING

## 2021-02-09 ASSESSMENT — PAIN SCALES - GENERAL: PAINLEVEL_OUTOF10: 6

## 2021-02-09 NOTE — PROGRESS NOTES
First time get up with pt assisted by Mark Cardona RN and Donna MAJOR. Pt was able to ambulate to the bathroom, leidy care provided, clean gown, tucks, dermaplast, ice pack, and underwear. Pt was able to void. Pt able to ambulate back to bed with standby assist. FOB at bedside with infant. Complete linen changed, comfort pad added to bed and housekeeping in room for clean up. Will continue to monitor.

## 2021-02-09 NOTE — H&P
Obstetrics Admission History and Physical    CC: contractions    HPI: Laz Ortiz is a 31 y.o.  at 38w6d who presents with complaints of contractions. Started at 0500 this morning, SROM while in triage. +FM, no VB. Very uncomfortable with contractions. Review of Systems: The following ROS was otherwise negative, except as noted in the HPI: constitutional, HEENT, respiratory, cardiovascular, gastrointestinal, genitourinary, skin, musculoskeletal, neurological, psych. OBGYN Provider : Toni    Obstetrical History:  OB History    Para Term  AB Living   3 2 2 0 0 2   SAB TAB Ectopic Molar Multiple Live Births   0 0 0 0 0 2      # Outcome Date GA Lbr Luis/2nd Weight Sex Delivery Anes PTL Lv   3 Current            2 Term 09 40w0d  8 lb (3.629 kg) F Vag-Spont None N TONYA      Name: Kael Lal   1 Term 06 40w0d  5 lb (2.268 kg) F Vag-Spont EPI N TONYA      Name: Layla Curtisrita       Past Medical History:   Past Medical History:   Diagnosis Date    Abnormal Pap smear of cervix     Anemia     Atypical squamous cells of undetermined significance (ASCUS) on Papanicolaou smear of cervix 2020    Depression     Fetal abnormality affecting management of mother 2020    Low grade squamous intraepithelial cervical dysplasia affecting pregnancy, antepartum 2020    f/o ppartum    Overactive bladder     Suppressed lactation, delivered with postpartum condition 2020    Thyroid disease        Medications:  No current facility-administered medications on file prior to encounter. Current Outpatient Medications on File Prior to Encounter   Medication Sig Dispense Refill    Prenatal Vit-Fe Fumarate-FA (PRENATAL LOW IRON) 27-0.8 MG TABS Take 1 tablet by mouth daily 90 tablet 1    ondansetron (ZOFRAN) 4 MG tablet Take 1 tablet by mouth every 8 hours as needed for Nausea 30 tablet 2       Allergies:  Ibuprofen    Surgical History:  No past surgical history on file.     Family History:  Family History   Problem Relation Age of Onset    Diabetes Maternal Grandmother     Hypertension Maternal Grandmother     Glaucoma Maternal Grandmother     No Known Problems Father     No Known Problems Mother        Social History:  Social History     Substance and Sexual Activity   Alcohol Use Not Currently     Social History     Substance and Sexual Activity   Drug Use Never     Social History     Tobacco Use   Smoking Status Never Smoker   Smokeless Tobacco Never Used       Physical Exam:  /71   Pulse 79   Temp 98.3 °F (36.8 °C) (Oral)   Resp 18   LMP 04/05/2020 (Approximate)   Physical Exam  Constitutional:       Appearance: Normal appearance. Comments: Appears uncomfortable with contractions   HENT:      Head: Normocephalic. Cardiovascular:      Rate and Rhythm: Normal rate. Pulmonary:      Effort: Pulmonary effort is normal. No respiratory distress. Abdominal:      Palpations: Abdomen is soft. Tenderness: There is no guarding or rebound. Skin:     General: Skin is warm and dry. Neurological:      General: No focal deficit present. Mental Status: She is alert.    Psychiatric:         Mood and Affect: Mood normal.       Cervix: 7-8/90/0, meconium stained fluid    Fetal Heart Monitor Interpretation:   FHT: 135 bpm, mod variability, +accels, early and variable decels  [place]: 2-3min    Labs:  Admission on 02/09/2021   Component Date Value    WBC 02/09/2021 10.2     RBC 02/09/2021 4.39     Hemoglobin 02/09/2021 12.7     Hematocrit 02/09/2021 38.7     MCV 02/09/2021 88.2     MCH 02/09/2021 29.1     MCHC 02/09/2021 33.0     RDW 02/09/2021 14.7     Platelets 55/87/4461 265     MPV 02/09/2021 7.9     Neutrophils % 02/09/2021 65.2     Lymphocytes % 02/09/2021 21.7     Monocytes % 02/09/2021 12.2     Eosinophils % 02/09/2021 0.4     Basophils % 02/09/2021 0.5     Neutrophils Absolute 02/09/2021 6.7     Lymphocytes Absolute 02/09/2021 2.2     Monocytes

## 2021-02-09 NOTE — LACTATION NOTE
This note was copied from a baby's chart. Lactation Progress Note      Data:   [facility] first [facility] feed after delivery. Mob is a multip who is breast feeding for the first time. Mob seems unsure of feeding choice but will to try for now. Baby currently at breast with good position and latch. Action: Reassure of good position and latch. Much breast feeding education provided. Breast milk feeding benefits provided. Encouraged to allow baby to go to breast ad belinda and stressed importance of always achieving a good deep latch. Offered LC assistance prn. Discouraged paci and bottle for the first few weeks. Encouraged good hydration and nutrition. [facility] on board for f/u. Response: Verbalized and demonstrated understanding and remains unsure of on going feeding plan.

## 2021-02-09 NOTE — PROGRESS NOTES
Patient actively pushing. RN remains in continuous attendance at the bedside. Assessment & evaluation of fetal heart rate ongoing via continuous EFM.    Dr Qasim Ross at bedside

## 2021-02-09 NOTE — ANESTHESIA PROCEDURE NOTES
Epidural Block    Patient location during procedure: OB  Start time: 2/9/2021 8:25 AM  End time: 2/9/2021 8:45 AM  Reason for block: labor epidural  Staffing  Performed: resident/CRNA   Resident/CRNA: HARJINDER Zhao CRNA  Preanesthetic Checklist  Completed: patient identified, IV checked, site marked, risks and benefits discussed, surgical consent, monitors and equipment checked, pre-op evaluation, timeout performed, anesthesia consent given, oxygen available and patient being monitored  Epidural  Patient position: sitting  Prep: ChloraPrep  Patient monitoring: cardiac monitor, continuous pulse ox and frequent blood pressure checks  Approach: midline  Location: lumbar (1-5) (L3-4)  Injection technique: BRYCE saline  Provider prep: mask and sterile gloves  Needle  Needle type: Tuohy   Needle gauge: 17 G  Needle length: 3.5 in  Catheter type: side hole  Catheter size: 19 G (20 G)  Catheter at skin depth: 12 cm  Test dose: negative  Assessment  Sensory level: T8  Hemodynamics: stable  Attempts: 2  Additional Notes  Sitting, Sterile prep/drape, 1%Xylo at L3-4, 17ga Tuohy with BRYCE, 25ga Pencan for w/+CSF for CSE, Pencan removed, Catheter inserted, negative test dose, sterile dressing applied.

## 2021-02-09 NOTE — LACTATION NOTE
This note was copied from a baby's chart. Introduced self to patient as Lactation RN, name and phone number written on white board in room. Assisted mother with getting a deep latch on left side in football hold. Mother instructed to call Lactation nurse for F/U care as needed.

## 2021-02-09 NOTE — L&D DELIVERY NOTE
[facility] Obstetrics and Gynecology  Spontaneous Vaginal Delivery Note        Pre-operative Diagnosis:    1. IUP at 38+6  2. Labor      Post-operative Diagnosis:   1. Postpartum s/p              Anesthesia:  epidural      Admission and Delivery:       Patient presented to [address] [place] [address] and Delivery with complaint of  contractions on 21. Patient was admitted for labor. She was 7/90/-1 on admission. She received an epidural for pain control. Patient progressed spontaneously to complete cervical dilation. Began pushing and with good maternal effort for approximately 20 minutes. The infant was delivered in the JAYESH position. Nuchal cord was absent. The shoulders and body were quickly to follow with maternal efforts. Infant was delivered with good tone and spontaneous cry without complication. Mouth and nose were bulb suctioned at maternal abdomen. Cord segment and cord blood were obtained. APGARS were noted to be 8 and 9. Delivery Date and Time: 21 at 0950. The placenta was delivered spontaneously with manual massage of the uterus and was noted to be intact with a 3 vessel cord. A loose true note was noted in the cord as well. Pitocin was started immediately after placenta delivery. Bleeding noted to be appropriate. No lacerations were noted on thorough inspection of the vagina and perineum. The patient tolerated well and is in stable condition following delivery. EBL: 400 ml     Infant Wt: Pending     APGARS: 8, 9      Specimen:  none    Infant Feeding: breast     Condition:  infant stable and mother stable      Attending Attestation: I performed the procedure.     Apple Baum MD

## 2021-02-09 NOTE — ANESTHESIA PRE PROCEDURE
Department of Anesthesiology  Preprocedure Note       Name:  Lonnie Mulligan   Age:  31 y.o.  :  1985                                          MRN:  [id number]         Date:  2021      Surgeon: * No surgeons listed *    Procedure: * No procedures listed *    Medications prior to admission:   Prior to Admission medications    Medication Sig Start Date End Date Taking?  Authorizing Provider   Prenatal Vit-Fe Fumarate-FA (PRENATAL LOW IRON) 27-0.8 MG TABS Take 1 tablet by mouth daily 20   Lg Ayala MD   ondansetron (ZOFRAN) 4 MG tablet Take 1 tablet by mouth every 8 hours as needed for Nausea 20   Bárbara Antony DO       Current medications:    Current Facility-Administered Medications   Medication Dose Route Frequency Provider Last Rate Last Admin    penicillin G potassium in d5w IVPB 2.5 Million Units  2.5 Million Units Intravenous Q4H Bárbara Antony DO        penicillin G potassium 5453111 units injection             lactated ringers infusion   Intravenous Continuous Gabriel Quintana  mL/hr at 21 0811 New Bag at 21 0811    dextrose 5 % infusion             terbutaline (BRETHINE) injection 0.25 mg  0.25 mg Subcutaneous Once Lg Ayala MD        lactated ringers infusion   Intravenous Continuous Lg Ayala MD        sodium chloride flush 0.9 % injection 10 mL  10 mL Intravenous 2 times per day Lg Ayala MD        sodium chloride flush 0.9 % injection 10 mL  10 mL Intravenous PRN Lg Ayala MD        lidocaine PF 1 % injection 30 mL  30 mL Other PRN Lg Ayala MD        ondansetron (ZOFRAN) injection 4 mg  4 mg Intravenous Q6H PRN Lg Ayala MD        diphenhydrAMINE (BENADRYL) injection 25 mg  25 mg Intravenous Q4H PRN Lg Ayala MD        acetaminophen (TYLENOL) tablet 650 mg  650 mg Oral Q4H PRN Lg Ayala MD        HYDROcodone-acetaminophen (NORCO) 5-325 MG per tablet 1 tablet  1 tablet Oral Q4H PRN Lg Ayala MD Or    HYDROcodone-acetaminophen (NORCO) 5-325 MG per tablet 2 tablet  2 tablet Oral Q4H PRN Joleen Cocks, MD       Dameon Roosevelt witch hazel-glycerin (TUCKS) pad   Topical PRN Joleen Montgomery MD        benzocaine-menthol (DERMOPLAST) 20-0.5 % spray   Topical PRN Joleen Montgomery MD        simethicone (MYLICON) chewable tablet 80 mg  80 mg Oral Q6H PRN Joleen Montgomery MD        docusate sodium (COLACE) capsule 100 mg  100 mg Oral BID Joleen Montgomery MD         Facility-Administered Medications Ordered in Other Encounters   Medication Dose Route Frequency Provider Last Rate Last Admin    bupivacaine (PF) (MARCAINE) 0.5 % injection    PRN Mancil Husbands, APRN - CRNA   1.5 mL at 02/09/21 0835       Allergies:     Allergies   Allergen Reactions    Ibuprofen Swelling       Problem List:    Patient Active Problem List   Diagnosis Code    Abnormal glucose R73.09    Adjustment disorder with anxiety F43.22    Adjustment disorder with mixed disturbance of emotions and conduct F43.25    Bacterial vaginosis N76.0, B96.89    Depressive disorder F32.9    Employment problem Z56.9    Flexural eczema L20.82    Gastro-esophageal reflux K21.9    Goiter E04.9    Hyperpigmentation of skin L81.9    Localized enlarged lymph nodes R59.0    Panic disorder with agoraphobia F40.01    Posttraumatic stress disorder F43.10    Regular astigmatism H52.229    Vitamin D deficiency E55.9    Nausea and vomiting of pregnancy, antepartum O21.9    Placenta previa in second trimester O44.02    Prenatal care in second trimester Z34.92    Hx of fetal anomaly in prior pregnancy, currently pregnant O09.299    GBS (group b Streptococcus) UTI complicating pregnancy, third trimester O23.43, B95.1    History of tobacco use Z87.891    Normal labor O80, Z37.9       Past Medical History:        Diagnosis Date    Abnormal Pap smear of cervix     Anemia  Atypical squamous cells of undetermined significance (ASCUS) on Papanicolaou smear of cervix 8/5/2020    Depression     Fetal abnormality affecting management of mother 8/5/2020    Low grade squamous intraepithelial cervical dysplasia affecting pregnancy, antepartum 8/5/2020    f/o ppartum    Overactive bladder     Suppressed lactation, delivered with postpartum condition 8/5/2020    Thyroid disease        Past Surgical History:  No past surgical history on file.     Social History:    Social History     Tobacco Use    Smoking status: Never Smoker    Smokeless tobacco: Never Used   Substance Use Topics    Alcohol use: Not Currently                                Counseling given: Not Answered      Vital Signs (Current):   Vitals:    02/09/21 0820 02/09/21 0845 02/09/21 0848   BP: 133/71 121/64 122/63   Pulse: 79 91 87   Resp: 18 18 (P) 18   Temp: 36.8 °C (98.3 °F)     TempSrc: Oral                                                BP Readings from Last 3 Encounters:   02/09/21 122/63   01/25/21 130/84   01/18/21 96/60       NPO Status:                                                                                 BMI:   Wt Readings from Last 3 Encounters:   01/25/21 227 lb 3.2 oz (103.1 kg)   01/18/21 230 lb 8 oz (104.6 kg)   12/07/20 220 lb 6.4 oz (100 kg)     There is no height or weight on file to calculate BMI.    CBC:   Lab Results   Component Value Date    WBC 10.2 02/09/2021    RBC 4.39 02/09/2021    HGB 12.7 02/09/2021    HCT 38.7 02/09/2021    MCV 88.2 02/09/2021    RDW 14.7 02/09/2021     02/09/2021       CMP:   Lab Results   Component Value Date     08/05/2020    K 3.8 08/05/2020     08/05/2020    CO2 22 08/05/2020    BUN 5 08/05/2020    CREATININE <0.5 08/05/2020    GFRAA >60 08/05/2020    AGRATIO 1.3 08/05/2020    LABGLOM >60 08/05/2020    GLUCOSE 117 08/05/2020    PROT 7.1 08/05/2020    CALCIUM 9.3 08/05/2020    BILITOT 0.4 08/05/2020    ALKPHOS 37 08/05/2020 AST 10 08/05/2020    ALT 10 08/05/2020       POC Tests: No results for input(s): POCGLU, POCNA, POCK, POCCL, POCBUN, POCHEMO, POCHCT in the last 72 hours. Coags: No results found for: PROTIME, INR, APTT    HCG (If Applicable):   Lab Results   Component Value Date    PREGTESTUR Positive 08/05/2020        ABGs: No results found for: PHART, PO2ART, EUA4XCU, RVE9ONN, BEART, C4LXIJQL     Type & Screen (If Applicable):  No results found for: LABABO, LABRH    Drug/Infectious Status (If Applicable):  No results found for: HIV, HEPCAB    COVID-19 Screening (If Applicable): No results found for: COVID19      Anesthesia Evaluation   no history of anesthetic complications:   Airway: Mallampati: III  TM distance: >3 FB   Neck ROM: full  Mouth opening: > = 3 FB Dental: normal exam         Pulmonary:Negative Pulmonary ROS and normal exam                               Cardiovascular:Negative CV ROS                      Neuro/Psych:   (+) psychiatric history (Depression, PTSD):            GI/Hepatic/Renal:   (+) GERD:,           Endo/Other: Negative Endo/Other ROS                    Abdominal:   (+) obese,         Vascular: negative vascular ROS. Anesthesia Plan      epidural     ASA 3 - emergent             Anesthetic plan and risks discussed with patient and spouse. Plan discussed with attending.                   HARJINDER Smith - CRNA   2/9/2021

## 2021-02-10 LAB
HCT VFR BLD CALC: 32.3 % (ref 36–48)
HEMOGLOBIN: 10.6 G/DL (ref 12–16)
MCH RBC QN AUTO: 29.2 PG (ref 26–34)
MCHC RBC AUTO-ENTMCNC: 32.8 G/DL (ref 31–36)
MCV RBC AUTO: 89 FL (ref 80–100)
PDW BLD-RTO: 14.7 % (ref 12.4–15.4)
PLATELET # BLD: 211 K/UL (ref 135–450)
PMV BLD AUTO: 8 FL (ref 5–10.5)
RBC # BLD: 3.63 M/UL (ref 4–5.2)
WBC # BLD: 13 K/UL (ref 4–11)

## 2021-02-10 PROCEDURE — 85027 COMPLETE CBC AUTOMATED: CPT

## 2021-02-10 PROCEDURE — 1220000000 HC SEMI PRIVATE OB R&B

## 2021-02-10 PROCEDURE — 36415 COLL VENOUS BLD VENIPUNCTURE: CPT

## 2021-02-10 PROCEDURE — 99024 POSTOP FOLLOW-UP VISIT: CPT | Performed by: OBSTETRICS & GYNECOLOGY

## 2021-02-10 PROCEDURE — 6370000000 HC RX 637 (ALT 250 FOR IP): Performed by: OBSTETRICS & GYNECOLOGY

## 2021-02-10 RX ADMIN — HYDROCODONE BITARTRATE AND ACETAMINOPHEN 1 TABLET: 5; 325 TABLET ORAL at 05:39

## 2021-02-10 RX ADMIN — DOCUSATE SODIUM 100 MG: 100 CAPSULE, LIQUID FILLED ORAL at 08:57

## 2021-02-10 RX ADMIN — ACETAMINOPHEN 650 MG: 325 TABLET ORAL at 18:16

## 2021-02-10 RX ADMIN — HYDROCODONE BITARTRATE AND ACETAMINOPHEN 1 TABLET: 5; 325 TABLET ORAL at 00:55

## 2021-02-10 RX ADMIN — HYDROCODONE BITARTRATE AND ACETAMINOPHEN 1 TABLET: 5; 325 TABLET ORAL at 09:56

## 2021-02-10 RX ADMIN — ACETAMINOPHEN 650 MG: 325 TABLET ORAL at 22:30

## 2021-02-10 RX ADMIN — HYDROCODONE BITARTRATE AND ACETAMINOPHEN 1 TABLET: 5; 325 TABLET ORAL at 14:04

## 2021-02-10 ASSESSMENT — PAIN SCALES - GENERAL
PAINLEVEL_OUTOF10: 4
PAINLEVEL_OUTOF10: 4
PAINLEVEL_OUTOF10: 3
PAINLEVEL_OUTOF10: 6
PAINLEVEL_OUTOF10: 3
PAINLEVEL_OUTOF10: 4

## 2021-02-10 NOTE — LACTATION NOTE
Lactation Progress Note      Data:   F/U on multip who initiated breast feeding for the first time. Mob had given formula over night. States that she has decided to continue with formula feeding and is not interested in putting baby to breast or pumping at this time. Action: Offered support of feeding decision but offered f/u LC assistance if she changes her mind. Response: Comfortable with feeding plan of formula feeding at this time.

## 2021-02-10 NOTE — LACTATION NOTE
This note was copied from a baby's chart. Lactation Progress Note      Data:     Called to room to assist mother with feeding infant. Infant had her bath an hour ago and is warmed back up and it is time to eat. Action: LC assisted mother with getting infant placed in football hold at the left breast. Helped mother with head and breast support. Once infant was latched, she had a good feeding with gentle stimulation. Mother encouraged to call for f/u assistance. Response: Mother was happy infant had a good feeding.

## 2021-02-10 NOTE — LACTATION NOTE
This note was copied from a baby's chart. Lactation Progress Note      Data:     Called to room to assist with getting a deeper latch. Action: LC assisted mother with getting infant positioned higher in bed to get a better latch. Infant is sleepy so she did latch some but after 10 minutes was tired. Mother encouraged to allow her to sleep for 3 hours and then try again. Mother encouraged to call for f/u assistance. Response: Mother was glad infant had a good feeding.

## 2021-02-11 VITALS
HEART RATE: 77 BPM | RESPIRATION RATE: 16 BRPM | SYSTOLIC BLOOD PRESSURE: 135 MMHG | HEIGHT: 67 IN | WEIGHT: 229 LBS | BODY MASS INDEX: 35.94 KG/M2 | TEMPERATURE: 97.5 F | OXYGEN SATURATION: 100 % | DIASTOLIC BLOOD PRESSURE: 69 MMHG

## 2021-02-11 PROCEDURE — 6370000000 HC RX 637 (ALT 250 FOR IP): Performed by: OBSTETRICS & GYNECOLOGY

## 2021-02-11 PROCEDURE — 99024 POSTOP FOLLOW-UP VISIT: CPT | Performed by: OBSTETRICS & GYNECOLOGY

## 2021-02-11 RX ORDER — FERROUS SULFATE 325(65) MG
325 TABLET ORAL
Qty: 30 TABLET | Refills: 1 | Status: SHIPPED | OUTPATIENT
Start: 2021-02-12 | End: 2021-04-19 | Stop reason: ALTCHOICE

## 2021-02-11 RX ORDER — PSEUDOEPHEDRINE HCL 30 MG
100 TABLET ORAL 2 TIMES DAILY PRN
Qty: 20 CAPSULE | Refills: 0 | Status: SHIPPED | OUTPATIENT
Start: 2021-02-11 | End: 2021-04-19 | Stop reason: ALTCHOICE

## 2021-02-11 RX ADMIN — ACETAMINOPHEN 650 MG: 325 TABLET ORAL at 06:41

## 2021-02-11 RX ADMIN — ACETAMINOPHEN 650 MG: 325 TABLET ORAL at 02:40

## 2021-02-11 ASSESSMENT — PAIN SCALES - GENERAL: PAINLEVEL_OUTOF10: 4

## 2021-02-11 NOTE — PLAN OF CARE
Problem: Pain:  Goal: Pain level will decrease  Description: Pain level will decrease  Outcome: Ongoing  Goal: Control of acute pain  Description: Control of acute pain  Outcome: Ongoing  Goal: Control of chronic pain  Description: Control of chronic pain  Outcome: Ongoing     Problem: VAGINAL DELIVERY - RECOVERY AND POST PARTUM  Goal: Vital signs are medically acceptable  Outcome: Ongoing  Goal: Patient will remain free of falls  Outcome: Ongoing  Goal: Fundus firm at midline  Outcome: Ongoing  Goal: Moderate rubra without clots, no purulent discharge, no foul smelling lochia  Outcome: Ongoing  Goal: Empties bladder  Outcome: Ongoing  Goal: Verbalizes understanding of normal bowel function resumption  Outcome: Ongoing  Goal: Edema will be absent or minimal  Outcome: Ongoing  Goal: Breasts are soft with nipple integrity intact  Outcome: Ongoing  Goal: Demonstrates appropriate breast feeding techniques  Outcome: Ongoing  Goal: Appropriate behavior observed  Outcome: Ongoing  Goal: Positive Mother-Baby interactions are observed  Outcome: Ongoing  Goal: Perineum intact without discharge or hematoma  Outcome: Ongoing  Goal: Ambulates independently  Outcome: Ongoing

## 2021-02-11 NOTE — PROGRESS NOTES
[facility] Ob/Gyn  Post Partum Progress Note     Subjective:   Tai Edwards is a 31 y.o. [id number] s/p  at 38w6d, PPD #2     Pregnancy complicated by GBS positive status.      Patient is doing well this morning. Reports pain is well controlled. Lochia is decreasing. Ambulating without difficulty. Denies dizziness on standing. Voiding without difficulty. Denies headache, vision changes, RUQ pain, increased LE edema. Denies chest pain, shortness of pain, fever, chills, nausea, vomiting.            Objective:   Vitals:    21 0821   BP: (!) 145/83   Pulse: 83   Resp: 16   Temp: 97.5 °F (36.4 °C)   SpO2:      BPs 120's-130's/60's-70's     GENERAL APPEARANCE: alert, well appearing, in no apparent distress  LUNGS: clear to auscultation, no wheezes, rales or rhonchi, symmetric air entry  HEART: regular rate and rhythm, no murmurs  ABDOMEN POSTPARTUM: Soft, non-tender, non-distended. No rebound or guarding. Fundus firm and non-tender below the umbilicus. EXTREMITIES: no redness or tenderness in the calves or thighs, +1 edema  SKIN: normal coloration and turgor, no rashes  NEUROLOGIC: alert, oriented, normal speech, no focal findings or movement disorder noted      Impression:  S/P Vaginal Delivery     Pertinent Labs:         Lab Results   Component Value Date     WBC 13.0 (H) 02/10/2021     HGB 10.6 (L) 02/10/2021     HCT 32.3 (L) 02/10/2021     MCV 89.0 02/10/2021      02/10/2021         Assessment / Plan:  Tai Edwards is a 31 y.o.  s/p  PPD #2      1. PPD #2     - Doing well, meeting postpartum milestones     - Hemoglobin appropriate     - Adequate pain control     - Continue routine post-partum care     2. AB pos / GBS pos     3. Infant details: F, [id number],      4.  Formula feeding: Also pumping     - Breast feeding support as needed     Disposition:   Home today

## 2021-02-11 NOTE — DISCHARGE SUMMARY
DISCHARGE SUMMARY      Primary Diagnosis:  Intrauterine pregnancy at 45 weeks gestation  Secondary Diagnosis:  History of anxiety and depression, GBBS positive  Procedures: normal spontaneous vaginal delivery  Referrals: lactation  Significant Findings: viable female   Reason for Hospitalization: active labor  Complications: none        Discharge Instructions:    Diet: common adult  Activity: activity as tolerated, no heavy lifting or driving for 2 weeks, pelvic rest x 6-8 weeks  Medications: Tylenol, colace, ferrous sulfate  Disposition: Home  Condition on discharge: Stable  Follow up: in 2 week(s)

## 2021-02-11 NOTE — PROGRESS NOTES
Discharge Phone Call Log  Patient Name: Roni Phipps     [facility] Care Provider: Vinod Dewey MD Discharge Date: 2021    Disposition of baby:    Phone Number: [phone] (home)     Attempts to Contact:  Date:    Nurse  Date:    Nurse  Date:    Nurse    1. Now that you are at home is your pain being well controlled? Y/N   What pain reducing measures are you using? ____________________________________        Information for the patient's :  Oma Swartz [[id number]]   Delivery Method: Vaginal, Spontaneous     2. Are you currently  having any infant feeding issues? Y/N _____________________________ If yes, please explain: __________________________________________________________________  3. If breastfeeding, were you satisfied with the breastfeeding support services offered? Y/N  4.  Have you had to supplement? Y/N If yes, please explain: _____________________________________________________       Did you supplement while in the hospital, or begin formula supplementation at home?________________________________________  5. Did your OB provider offer you information about the benefits of breastfeeding during your prenatal visits? Y/N  6.  Have you made or have you already had your first appointment with the baby's doctor? Y/N If no, do you know when to schedule it? Y/N   7.  Have you scheduled your follow-up appointment? Y/N  If no, do you know when to schedule it? Y/N  8. Did staff discuss safe sleep during your stay? Y/N  Did you see the wall cling posted in your room explaining how to keep you and your baby safe? Y/N  10. Did your nurses and physicians include you in the plan of care, communicating with you respectfully? Y/N If no, please explain __________________________  11. Is there anyone in particular you would like to mention who provided care for you? ________________________________  12. Did your discharge occur in a timely manner?   Y/N If no, please explain __________________________  13. Do you have any other questions or concerns I can address today?  Y/N  __________________________________________________      Teaching During interview :_____________________________________________  ___________________________RN       Date:______________Time:________________

## 2021-02-14 ENCOUNTER — APPOINTMENT (OUTPATIENT)
Dept: CT IMAGING | Age: 36
End: 2021-02-14
Payer: COMMERCIAL

## 2021-02-14 ENCOUNTER — HOSPITAL ENCOUNTER (EMERGENCY)
Age: 36
Discharge: HOME | End: 2021-02-14
Attending: EMERGENCY MEDICINE
Payer: COMMERCIAL

## 2021-02-14 VITALS
SYSTOLIC BLOOD PRESSURE: 124 MMHG | HEIGHT: 67 IN | OXYGEN SATURATION: 99 % | RESPIRATION RATE: 14 BRPM | HEART RATE: 62 BPM | TEMPERATURE: 98.6 F | WEIGHT: 229 LBS | DIASTOLIC BLOOD PRESSURE: 73 MMHG | BODY MASS INDEX: 35.94 KG/M2

## 2021-02-14 DIAGNOSIS — R07.89 ATYPICAL CHEST PAIN: Primary | ICD-10-CM

## 2021-02-14 DIAGNOSIS — M79.89 LEG SWELLING: ICD-10-CM

## 2021-02-14 LAB
ALBUMIN SERPL-MCNC: 3.6 G/DL (ref 3.4–5)
ALP BLD-CCNC: 81 U/L (ref 40–129)
ALT SERPL-CCNC: 37 U/L (ref 10–40)
ANION GAP SERPL CALCULATED.3IONS-SCNC: 9 MMOL/L (ref 3–16)
AST SERPL-CCNC: 27 U/L (ref 15–37)
BASOPHILS ABSOLUTE: 0.1 K/UL (ref 0–0.2)
BASOPHILS RELATIVE PERCENT: 1.1 %
BILIRUB SERPL-MCNC: 0.4 MG/DL (ref 0–1)
BILIRUBIN DIRECT: <0.2 MG/DL (ref 0–0.3)
BILIRUBIN URINE: NEGATIVE
BILIRUBIN, INDIRECT: ABNORMAL MG/DL (ref 0–1)
BLOOD, URINE: ABNORMAL
BUN BLDV-MCNC: 9 MG/DL (ref 7–20)
CALCIUM SERPL-MCNC: 9 MG/DL (ref 8.3–10.6)
CHLORIDE BLD-SCNC: 103 MMOL/L (ref 99–110)
CLARITY: CLEAR
CO2: 25 MMOL/L (ref 21–32)
COLOR: YELLOW
CREAT SERPL-MCNC: 0.8 MG/DL (ref 0.6–1.1)
EOSINOPHILS ABSOLUTE: 0.2 K/UL (ref 0–0.6)
EOSINOPHILS RELATIVE PERCENT: 2.3 %
EPITHELIAL CELLS, UA: ABNORMAL /HPF (ref 0–5)
GFR AFRICAN AMERICAN: >60
GFR NON-AFRICAN AMERICAN: >60
GLUCOSE BLD-MCNC: 114 MG/DL (ref 70–99)
GLUCOSE URINE: NEGATIVE MG/DL
HCT VFR BLD CALC: 33.9 % (ref 36–48)
HEMOGLOBIN: 10.9 G/DL (ref 12–16)
KETONES, URINE: NEGATIVE MG/DL
LACTATE DEHYDROGENASE: 310 U/L (ref 100–190)
LEUKOCYTE ESTERASE, URINE: NEGATIVE
LYMPHOCYTES ABSOLUTE: 2.2 K/UL (ref 1–5.1)
LYMPHOCYTES RELATIVE PERCENT: 23 %
MCH RBC QN AUTO: 28.8 PG (ref 26–34)
MCHC RBC AUTO-ENTMCNC: 32.2 G/DL (ref 31–36)
MCV RBC AUTO: 89.6 FL (ref 80–100)
MICROSCOPIC EXAMINATION: YES
MONOCYTES ABSOLUTE: 1 K/UL (ref 0–1.3)
MONOCYTES RELATIVE PERCENT: 10.5 %
NEUTROPHILS ABSOLUTE: 6.1 K/UL (ref 1.7–7.7)
NEUTROPHILS RELATIVE PERCENT: 63.1 %
NITRITE, URINE: NEGATIVE
PDW BLD-RTO: 14.6 % (ref 12.4–15.4)
PH UA: 7 (ref 5–8)
PLATELET # BLD: 251 K/UL (ref 135–450)
PMV BLD AUTO: 8.2 FL (ref 5–10.5)
POTASSIUM REFLEX MAGNESIUM: 3.6 MMOL/L (ref 3.5–5.1)
PROTEIN UA: NEGATIVE MG/DL
RBC # BLD: 3.78 M/UL (ref 4–5.2)
RBC UA: ABNORMAL /HPF (ref 0–4)
SODIUM BLD-SCNC: 137 MMOL/L (ref 136–145)
SPECIFIC GRAVITY UA: 1.01 (ref 1–1.03)
TOTAL PROTEIN: 6.3 G/DL (ref 6.4–8.2)
TROPONIN: <0.01 NG/ML
URIC ACID, SERUM: 3.7 MG/DL (ref 2.6–6)
URINE TYPE: ABNORMAL
UROBILINOGEN, URINE: 0.2 E.U./DL
WBC # BLD: 9.6 K/UL (ref 4–11)
WBC UA: ABNORMAL /HPF (ref 0–5)

## 2021-02-14 PROCEDURE — 93005 ELECTROCARDIOGRAM TRACING: CPT | Performed by: EMERGENCY MEDICINE

## 2021-02-14 PROCEDURE — 84484 ASSAY OF TROPONIN QUANT: CPT

## 2021-02-14 PROCEDURE — 84550 ASSAY OF BLOOD/URIC ACID: CPT

## 2021-02-14 PROCEDURE — 83615 LACTATE (LD) (LDH) ENZYME: CPT

## 2021-02-14 PROCEDURE — 99285 EMERGENCY DEPT VISIT HI MDM: CPT

## 2021-02-14 PROCEDURE — 6360000004 HC RX CONTRAST MEDICATION: Performed by: EMERGENCY MEDICINE

## 2021-02-14 PROCEDURE — 81001 URINALYSIS AUTO W/SCOPE: CPT

## 2021-02-14 PROCEDURE — 80076 HEPATIC FUNCTION PANEL: CPT

## 2021-02-14 PROCEDURE — 80048 BASIC METABOLIC PNL TOTAL CA: CPT

## 2021-02-14 PROCEDURE — 71260 CT THORAX DX C+: CPT

## 2021-02-14 PROCEDURE — 85025 COMPLETE CBC W/AUTO DIFF WBC: CPT

## 2021-02-14 PROCEDURE — 6370000000 HC RX 637 (ALT 250 FOR IP): Performed by: EMERGENCY MEDICINE

## 2021-02-14 RX ORDER — FUROSEMIDE 20 MG/1
20 TABLET ORAL ONCE
Status: COMPLETED | OUTPATIENT
Start: 2021-02-14 | End: 2021-02-14

## 2021-02-14 RX ORDER — FUROSEMIDE 20 MG/1
20 TABLET ORAL DAILY
Qty: 5 TABLET | Refills: 1 | Status: SHIPPED | OUTPATIENT
Start: 2021-02-14 | End: 2021-04-19 | Stop reason: ALTCHOICE

## 2021-02-14 RX ORDER — ACETAMINOPHEN 325 MG/1
650 TABLET ORAL ONCE
Status: COMPLETED | OUTPATIENT
Start: 2021-02-14 | End: 2021-02-14

## 2021-02-14 RX ORDER — POTASSIUM CHLORIDE 750 MG/1
10 TABLET, EXTENDED RELEASE ORAL 2 TIMES DAILY
Qty: 10 TABLET | Refills: 1 | Status: SHIPPED | OUTPATIENT
Start: 2021-02-14 | End: 2021-04-19 | Stop reason: ALTCHOICE

## 2021-02-14 RX ORDER — CYCLOBENZAPRINE HCL 10 MG
10 TABLET ORAL ONCE
Status: COMPLETED | OUTPATIENT
Start: 2021-02-14 | End: 2021-02-14

## 2021-02-14 RX ORDER — CYCLOBENZAPRINE HCL 10 MG
10 TABLET ORAL 3 TIMES DAILY PRN
Qty: 21 TABLET | Refills: 0 | Status: SHIPPED | OUTPATIENT
Start: 2021-02-14 | End: 2021-02-24

## 2021-02-14 RX ADMIN — CYCLOBENZAPRINE 10 MG: 10 TABLET, FILM COATED ORAL at 21:08

## 2021-02-14 RX ADMIN — FUROSEMIDE 20 MG: 20 TABLET ORAL at 21:08

## 2021-02-14 RX ADMIN — IOPAMIDOL 75 ML: 755 INJECTION, SOLUTION INTRAVENOUS at 20:34

## 2021-02-14 RX ADMIN — ACETAMINOPHEN 650 MG: 325 TABLET ORAL at 19:50

## 2021-02-14 ASSESSMENT — PAIN SCALES - GENERAL
PAINLEVEL_OUTOF10: 4
PAINLEVEL_OUTOF10: 7

## 2021-02-15 ENCOUNTER — TELEPHONE (OUTPATIENT)
Dept: OBGYN CLINIC | Age: 36
End: 2021-02-15

## 2021-02-15 LAB
EKG ATRIAL RATE: 75 BPM
EKG DIAGNOSIS: NORMAL
EKG P AXIS: 56 DEGREES
EKG P-R INTERVAL: 172 MS
EKG Q-T INTERVAL: 378 MS
EKG QRS DURATION: 72 MS
EKG QTC CALCULATION (BAZETT): 422 MS
EKG R AXIS: 89 DEGREES
EKG T AXIS: 57 DEGREES
EKG VENTRICULAR RATE: 75 BPM

## 2021-02-15 PROCEDURE — 93010 ELECTROCARDIOGRAM REPORT: CPT | Performed by: INTERNAL MEDICINE

## 2021-02-15 NOTE — ED NOTES
[facility] @ 2050  RE: post partm 5 days cp per Dr. Rosy Cole responded @ 2053       Metmatty Kelleru  02/14/21 2054

## 2021-02-15 NOTE — TELEPHONE ENCOUNTER
LM for pt to call office. Need to check her recent B/P readings and get scheduled in office this week.

## 2021-02-15 NOTE — ED PROVIDER NOTES
Emergency Department Encounter    Patient: Petra Mandel  MRN: [id number]  : 1985  Date of Evaluation: 2021  ED Provider:  Nitesh Read    Triage Chief Complaint:   Chest Pain (had a baby deepak is having feet swelling and chest pain) and Leg Swelling (no swelling after delivery, noticed yesterday, worse today.)    [place]:  Petra Mandel is a 31 y.o. female that presents to the ER for evaluation of acute precordial chest pain, G3, P3 female 5 days postpartum vaginal delivery, persistent mild vaginal bleeding positive pleuritic chest pain mild edema of her bilateral legs. No sickle cell disease no preeclampsia no double vision no headache. She is not breast-feeding. ROS - see HPI, below listed is current ROS at time of my eval:  General:  No fevers, no weakness  Eyes:  no discharge  ENT:  No sore throat, no nasal congestion  Cardiovascular:  + chest pain, no palpitations  Respiratory:  No shortness of breath, no cough, no wheezing  Gastrointestinal:  No pain, no nausea, no vomiting, no diarrhea  Musculoskeletal:  No muscle pain, no joint pain  Skin:  No rash, no pruritis  Neurologic:  No speech problems, no headache, no extremity numbness, no extremity tingling, no extremity weakness  Psychiatric:  No anxiety  Genitourinary:  No dysuria, no hematuria  Endocrine:  No unexpected weight gain, no unexpected weight loss  Extremities:  no edema, no pain    Past Medical History:   Diagnosis Date    Abnormal Pap smear of cervix     Anemia     Atypical squamous cells of undetermined significance (ASCUS) on Papanicolaou smear of cervix 2020    Depression     Fetal abnormality affecting management of mother 2020    Low grade squamous intraepithelial cervical dysplasia affecting pregnancy, antepartum 2020    f/o ppartum    Overactive bladder     Suppressed lactation, delivered with postpartum condition 2020    Thyroid disease      History reviewed.  No pertinent surgical tablet Take 1 tablet by mouth 3 times daily as needed for Muscle spasms 21 tablet 0    ferrous sulfate (IRON 325) 325 (65 Fe) MG tablet Take 1 tablet by mouth daily (with breakfast) 30 tablet 1    docusate sodium (COLACE, DULCOLAX) 100 MG CAPS Take 100 mg by mouth 2 times daily as needed for Constipation 20 capsule 0    Prenatal Vit-Fe Fumarate-FA (PRENATAL LOW IRON) 27-0.8 MG TABS Take 1 tablet by mouth daily 90 tablet 1    ondansetron (ZOFRAN) 4 MG tablet Take 1 tablet by mouth every 8 hours as needed for Nausea 30 tablet 2     Allergies   Allergen Reactions    Ibuprofen Swelling       Nursing Notes Reviewed    Physical Exam:  Triage VS:    ED Triage Vitals   Enc Vitals Group      BP 02/14/21 1857 (!) 154/80      Pulse 02/14/21 1838 94      Resp 02/14/21 1838 16      Temp 02/14/21 1857 98.6 °F (37 °C)      Temp Source 02/14/21 1857 Oral      SpO2 02/14/21 1838 99 %      Weight 02/14/21 1848 229 lb (103.9 kg)      Height 02/14/21 1848 5' 7\" (1.702 m)      Head Circumference --       Peak Flow --       Pain Score --       Pain Loc --       Pain Edu? --       Excl. in [address]? --        My pulse ox interpretation is - normal    General appearance:  No acute distress. Skin:  Warm. Dry. Eye:  Extraocular movements intact. Ears, nose, mouth and throat:  Oral mucosa moist   Neck:  Trachea midline. Extremity:  No swelling. Normal ROM     Heart:  Regular rate and rhythm, normal S1 & S2, no extra heart sounds. Perfusion:  intact  Respiratory:  Lungs clear to auscultation bilaterally. Respirations nonlabored. Abdominal:  Normal bowel sounds. Soft. Nontender. Non distended. Neurological:  Alert and oriented times 3.              Psychiatric:  Appropriate    I have reviewed and interpreted all of the currently available lab results from this visit (if applicable):  Results for orders placed or performed during the hospital encounter of 02/14/21   CBC Auto Differential   Result Value Ref Range    WBC 9.6 4.0 - 11.0 K/uL    RBC 3.78 (L) 4.00 - 5.20 M/uL    Hemoglobin 10.9 (L) 12.0 - 16.0 g/dL    Hematocrit 33.9 (L) 36.0 - 48.0 %    MCV 89.6 80.0 - 100.0 fL    MCH 28.8 26.0 - 34.0 pg    MCHC 32.2 31.0 - 36.0 g/dL    RDW 14.6 12.4 - 15.4 %    Platelets 761 327 - 152 K/uL    MPV 8.2 5.0 - 10.5 fL    Neutrophils % 63.1 %    Lymphocytes % 23.0 %    Monocytes % 10.5 %    Eosinophils % 2.3 %    Basophils % 1.1 %    Neutrophils Absolute 6.1 1.7 - 7.7 K/uL    Lymphocytes Absolute 2.2 1.0 - 5.1 K/uL    Monocytes Absolute 1.0 0.0 - 1.3 K/uL    Eosinophils Absolute 0.2 0.0 - 0.6 K/uL    Basophils Absolute 0.1 0.0 - 0.2 K/uL   Basic Metabolic Panel w/ Reflex to MG   Result Value Ref Range    Sodium 137 136 - 145 mmol/L    Potassium reflex Magnesium 3.6 3.5 - 5.1 mmol/L    Chloride 103 99 - 110 mmol/L    CO2 25 21 - 32 mmol/L    Anion Gap 9 3 - 16    Glucose 114 (H) 70 - 99 mg/dL    BUN 9 7 - 20 mg/dL    CREATININE 0.8 0.6 - 1.1 mg/dL    GFR Non-African American >60 >60    GFR African American >60 >60    Calcium 9.0 8.3 - 10.6 mg/dL   Troponin   Result Value Ref Range    Troponin <0.01 <0.01 ng/mL   Hepatic Function Panel   Result Value Ref Range    Total Protein 6.3 (L) 6.4 - 8.2 g/dL    Albumin 3.6 3.4 - 5.0 g/dL    Alkaline Phosphatase 81 40 - 129 U/L    ALT 37 10 - 40 U/L    AST 27 15 - 37 U/L    Total Bilirubin 0.4 0.0 - 1.0 mg/dL    Bilirubin, Direct <0.2 0.0 - 0.3 mg/dL    Bilirubin, Indirect see below 0.0 - 1.0 mg/dL   Lactate Dehydrogenase   Result Value Ref Range     (H) 100 - 190 U/L   Urinalysis, reflex to microscopic   Result Value Ref Range    Color, UA Yellow Straw/Yellow    Clarity, UA Clear Clear    Glucose, Ur Negative Negative mg/dL    Bilirubin Urine Negative Negative    Ketones, Urine Negative Negative mg/dL    Specific Gravity, UA 1.015 1.005 - 1.030    Blood, Urine LARGE (A) Negative    pH, UA 7.0 5.0 - 8.0    Protein, UA Negative Negative mg/dL    Urobilinogen, Urine 0.2 <2.0 E.U./dL    Nitrite, Urine Negative Negative    Leukocyte Esterase, Urine Negative Negative    Microscopic Examination YES     Urine Type NotGiven    Uric acid   Result Value Ref Range    Uric Acid, Serum 3.7 2.6 - 6.0 mg/dL   Microscopic Urinalysis   Result Value Ref Range    WBC, UA 0-2 0 - 5 /HPF    RBC, UA 21-50 (A) 0 - 4 /HPF    Epithelial Cells, UA 2-5 0 - 5 /HPF   EKG 12 Lead   Result Value Ref Range    Ventricular Rate 75 BPM    Atrial Rate 75 BPM    P-R Interval 172 ms    QRS Duration 72 ms    Q-T Interval 378 ms    QTc Calculation (Bazett) 422 ms    P Axis 56 degrees    R Axis 89 degrees    T Axis 57 degrees    Diagnosis       Normal sinus rhythmPossible Septal infarct , age undeterminedAbnormal ECGConfirmed by Xiomara Horton MD, Anthony Booker ([id number]) on 2/15/2021 11:22:30 AM      Radiographs (if obtained):  Radiologist's Report Reviewed:  No results found. EKG (if obtained): (All EKG's are interpreted by myself in the absence of a cardiologist)      MDM:  Patient presented with chest pain. Given history and presentation cardiac workup initiated. Patient had labs, EKG, x-ray and troponin ordered. Patient was placed on monitor. Patient's pulse ox is normal.      HEART Score:   2    Patient's chest x-ray is read by radiology as negative for acute abnormality    The patient's initial troponin is within the normal range. Patient's EKG did not show any acute diagnostic ischemic changes. The patient was given medications, is starting to feel better. Patient does not have any acute hemodynamic instability. I completed a HEART PATHWAY to screen for Acute Coronary Syndrome (ACS) in this patient. The evidence indicates that the patient is very low risk for ACS and this is consistent with my clinical intuition. The risk of further workup or hospitalization is likely higher than the risk of the patient having an ACS.  It is, therefore, in the patients best interest not to do additional emergent testing or be hospitalized    No evidence of PE

## 2021-02-15 NOTE — TELEPHONE ENCOUNTER
Patient stated she is not able to check at home. She does not have a monitor to  Be able to check her Bps, stated she wanted to be seen this week and could only come in on Friday.  Is scheduled Friday in am.

## 2021-02-15 NOTE — ED NOTES
Patient's OB is [facility] [employer].      Sheryl Valencia RN  02/14/21 [id number]

## 2021-02-15 NOTE — TELEPHONE ENCOUNTER
Per ED physician she was discharged home with a script for a BP cuff. We are happy to provide this script if not already done so. Thank you.

## 2021-02-17 RX ORDER — BLOOD PRESSURE TEST KIT
1 KIT MISCELLANEOUS 2 TIMES DAILY
Qty: 1 KIT | Refills: 0 | Status: SHIPPED | OUTPATIENT
Start: 2021-02-17 | End: 2021-04-19 | Stop reason: ALTCHOICE

## 2021-02-17 NOTE — TELEPHONE ENCOUNTER
Pt was called and made aware to start checking B/P's at home now and call for > 150/100. Pt acknowledged understanding.  DONE

## 2021-02-17 NOTE — TELEPHONE ENCOUNTER
She needs to be checking her blood pressure at home  I will send rx to pharmacy now   Review precautions including to call if BP >150/100     Thank you  [facility]

## 2021-02-19 ENCOUNTER — POSTPARTUM VISIT (OUTPATIENT)
Dept: OBGYN CLINIC | Age: 36
End: 2021-02-19
Payer: COMMERCIAL

## 2021-02-19 VITALS
SYSTOLIC BLOOD PRESSURE: 124 MMHG | WEIGHT: 224.4 LBS | HEART RATE: 78 BPM | BODY MASS INDEX: 35.15 KG/M2 | TEMPERATURE: 98.1 F | DIASTOLIC BLOOD PRESSURE: 80 MMHG

## 2021-02-19 DIAGNOSIS — Z91.199 NONCOMPLIANCE: ICD-10-CM

## 2021-02-19 DIAGNOSIS — Z87.891 HISTORY OF TOBACCO USE: ICD-10-CM

## 2021-02-19 DIAGNOSIS — R03.0 TRANSIENT ELEVATED BLOOD PRESSURE: ICD-10-CM

## 2021-02-19 PROCEDURE — G8417 CALC BMI ABV UP PARAM F/U: HCPCS | Performed by: OBSTETRICS & GYNECOLOGY

## 2021-02-19 PROCEDURE — 1036F TOBACCO NON-USER: CPT | Performed by: OBSTETRICS & GYNECOLOGY

## 2021-02-19 PROCEDURE — 1111F DSCHRG MED/CURRENT MED MERGE: CPT | Performed by: OBSTETRICS & GYNECOLOGY

## 2021-02-19 PROCEDURE — G8484 FLU IMMUNIZE NO ADMIN: HCPCS | Performed by: OBSTETRICS & GYNECOLOGY

## 2021-02-19 PROCEDURE — 99214 OFFICE O/P EST MOD 30 MIN: CPT | Performed by: OBSTETRICS & GYNECOLOGY

## 2021-02-19 PROCEDURE — 36415 COLL VENOUS BLD VENIPUNCTURE: CPT | Performed by: OBSTETRICS & GYNECOLOGY

## 2021-02-19 PROCEDURE — G8427 DOCREV CUR MEDS BY ELIG CLIN: HCPCS | Performed by: OBSTETRICS & GYNECOLOGY

## 2021-02-19 NOTE — PROGRESS NOTES
[facility]  Post Partum Visit     Subjective:   Onel Duron is a 31 y.o. [id number] s/p  at 38w6d, here for her post partum visit.      Pregnancy complicated by GBS positive status, poor follow up during pregnancy and post partum blood pressure elevations and edema.     Patient is doing well this morning but does admit to increase in edema, soreness to hips/back. She was seen in ED on 21 where she was complaining of shortness of breath, high blood pressure. BP normal today, 124/80 with a hx of elevated BP during ED visit (154/80). Labs were normal. She was given Lasix for swelling. She was supposed to take BP at home and keep a log but has not done so. Relayed that I had sent an Rx for blood pressure cuff to her pharmacy -- she declines knowledge of this. Reports pain is well controlled. Lochia is decreasing. Ambulating without difficulty. Denies dizziness on standing. Voiding without difficulty but states she is not voiding as much as usual.  Denies headache, vision changes, RUQ pain, increased LE edema. Denies chest pain, shortness of pain now -- but states she feels fatigued, Denies fever, chills, nausea, vomiting. Denies any sexual activity. Baby is feeding with bottle and breast -- doing well and gaining weight.      Objective:   Vitals:    21 1055   BP: 124/80   Pulse: 78   Temp: 98.1 °F (36.7 °C)      GENERAL APPEARANCE: alert, well appearing, in no apparent distress  LUNGS: clear to auscultation, no wheezes, rales or rhonchi, symmetric air entry  HEART: regular rate and rhythm, no murmurs  ABDOMEN POSTPARTUM: Soft, non-tender, non-distended. No rebound or guarding. Fundus firm and non-tender below the umbilicus.    EXTREMITIES: no redness or tenderness in the calves or thighs, +1 edema  SKIN: normal coloration and turgor, no rashes  NEUROLOGIC: alert, oriented, normal speech, no focal findings or movement disorder noted      Impression:  S/P Vaginal Delivery    Pertinent Labs:         Lab Results   Component Value Date     WBC 13.0 (H) 02/10/2021     HGB 10.6 (L) 02/10/2021     HCT 32.3 (L) 02/10/2021     MCV 89.0 02/10/2021      02/10/2021         Assessment / Plan:  Jones Locke is a 31 y.o.  s/p  at 38w6d, here for her post partum visit:       Diagnosis Orders   1. Encounter for postpartum visit     2. Postpartum edema  BRAIN NATRIURETIC PEPTIDE (BNP)    Comprehensive Metabolic Panel    CBC Auto Differential    LACTATE DEHYDROGENASE    Uric Acid    Protein / creatinine ratio, urine   3. Transient elevated blood pressure     4. History of tobacco use     5. BMI 31.0-31.9,adult     6. Noncompliance       - BP normal today, 124/80 with a hx of elevated BP during ED visit. Due to concerns for swelling and fatigue, will repeat [facility] labs / BNP today. Gave pt the option for direct admission to hospital vs calling her back once labs return to make plan -- pt declines hospital observation at this time. - recommend she stop lasix   - recommend she  BP cuff and to call with any values >150/100.   - otherwise meeting PP milestones  - recommend PO fluids, colace for constipation  - moods are good, has lots of help with baby at home     Follow Up  Return in about 1 week (around 2021) for [facility] Partum Visit.

## 2021-02-20 ENCOUNTER — TELEPHONE (OUTPATIENT)
Dept: OBGYN CLINIC | Age: 36
End: 2021-02-20

## 2021-02-20 PROBLEM — O44.02 PLACENTA PREVIA IN SECOND TRIMESTER: Status: RESOLVED | Noted: 2020-10-08 | Resolved: 2021-02-20

## 2021-02-20 LAB
A/G RATIO: 1.1 (ref 1.1–2.2)
ALBUMIN SERPL-MCNC: 3.5 G/DL (ref 3.4–5)
ALP BLD-CCNC: 64 U/L (ref 40–129)
ALT SERPL-CCNC: 25 U/L (ref 10–40)
ANION GAP SERPL CALCULATED.3IONS-SCNC: 13 MMOL/L (ref 3–16)
AST SERPL-CCNC: 20 U/L (ref 15–37)
BASOPHILS ABSOLUTE: 0 K/UL (ref 0–0.2)
BASOPHILS RELATIVE PERCENT: 0.7 %
BILIRUB SERPL-MCNC: 0.4 MG/DL (ref 0–1)
BUN BLDV-MCNC: 7 MG/DL (ref 7–20)
CALCIUM SERPL-MCNC: 8.9 MG/DL (ref 8.3–10.6)
CHLORIDE BLD-SCNC: 105 MMOL/L (ref 99–110)
CO2: 23 MMOL/L (ref 21–32)
CREAT SERPL-MCNC: 0.6 MG/DL (ref 0.6–1.1)
CREATININE URINE: 103.5 MG/DL (ref 28–259)
EOSINOPHILS ABSOLUTE: 0 K/UL (ref 0–0.6)
EOSINOPHILS RELATIVE PERCENT: 0.9 %
GFR AFRICAN AMERICAN: >60
GFR NON-AFRICAN AMERICAN: >60
GLOBULIN: 3.3 G/DL
GLUCOSE BLD-MCNC: 68 MG/DL (ref 70–99)
HCT VFR BLD CALC: 38.4 % (ref 36–48)
HEMOGLOBIN: 12.3 G/DL (ref 12–16)
LACTATE DEHYDROGENASE: 404 U/L (ref 100–190)
LYMPHOCYTES ABSOLUTE: 1.5 K/UL (ref 1–5.1)
LYMPHOCYTES RELATIVE PERCENT: 28.2 %
MCH RBC QN AUTO: 29 PG (ref 26–34)
MCHC RBC AUTO-ENTMCNC: 32.1 G/DL (ref 31–36)
MCV RBC AUTO: 90.3 FL (ref 80–100)
MONOCYTES ABSOLUTE: 0.8 K/UL (ref 0–1.3)
MONOCYTES RELATIVE PERCENT: 14.9 %
NEUTROPHILS ABSOLUTE: 2.9 K/UL (ref 1.7–7.7)
NEUTROPHILS RELATIVE PERCENT: 55.3 %
PDW BLD-RTO: 15.4 % (ref 12.4–15.4)
PLATELET # BLD: 259 K/UL (ref 135–450)
PMV BLD AUTO: 8.2 FL (ref 5–10.5)
POTASSIUM SERPL-SCNC: 3.9 MMOL/L (ref 3.5–5.1)
PRO-BNP: 113 PG/ML (ref 0–124)
PROTEIN PROTEIN: 96 MG/DL
PROTEIN/CREAT RATIO: 0.9 MG/DL
RBC # BLD: 4.25 M/UL (ref 4–5.2)
SODIUM BLD-SCNC: 141 MMOL/L (ref 136–145)
TOTAL PROTEIN: 6.8 G/DL (ref 6.4–8.2)
URIC ACID, SERUM: 5.2 MG/DL (ref 2.6–6)
WBC # BLD: 5.3 K/UL (ref 4–11)

## 2021-02-20 NOTE — TELEPHONE ENCOUNTER
Attempted to call patient multiple times to review results and discuss follow up. Could not leave voicemail as it was full. If pt returns call, I need to speak with her.      [place]

## 2021-03-08 ENCOUNTER — TELEPHONE (OUTPATIENT)
Dept: FAMILY MEDICINE CLINIC | Age: 36
End: 2021-03-08

## 2021-03-08 NOTE — TELEPHONE ENCOUNTER
----- Message from Shyla Snow sent at 3/8/2021  1:24 PM EST -----  Subject: Appointment Request    Reason for Call: Routine (Patient Request) No Script    QUESTIONS  Type of Appointment? Established Patient  Reason for appointment request? No appointments available during search  Additional Information for Provider? Patient's left wrist is locking up   and she would like to schedule an appointment   please advise. She states she last saw Dr. Shravan Bueno in August of 2020.  ---------------------------------------------------------------------------  --------------  Shaka Miller INFO  What is the best way for the office to contact you? OK to leave message on   voicemail  Preferred Call Back Phone Number? [phone]  ---------------------------------------------------------------------------  --------------  SCRIPT ANSWERS  Relationship to Patient? Self  Appointment reason? Symptomatic  Select script based on patient symptoms? Adult No Script   (Is the patient requesting to see the provider for a procedure?)? No  (Is the patient requesting to see the provider urgently  today or   tomorrow. )? No  Have you been diagnosed with   tested for   or told that you are suspected of having COVID-19 (Coronavirus)? No  Have you had a fever or taken medication to treat a fever within the past   3 days? No  Have you had a cough   shortness of breath or flu-like symptoms within the past 3 days? No  Do you currently have flu-like symptoms including fever or chills   cough   shortness of breath   or difficulty breathing   or new loss of taste or smell? No  (Service Expert  click yes below to proceed with [employer] As Usual   Scheduling)?  Yes

## 2021-03-17 ENCOUNTER — HOSPITAL ENCOUNTER (OUTPATIENT)
Dept: GENERAL RADIOLOGY | Age: 36
Discharge: HOME | End: 2021-03-17
Payer: COMMERCIAL

## 2021-03-17 ENCOUNTER — OFFICE VISIT (OUTPATIENT)
Dept: FAMILY MEDICINE CLINIC | Age: 36
End: 2021-03-17
Payer: COMMERCIAL

## 2021-03-17 ENCOUNTER — HOSPITAL ENCOUNTER (OUTPATIENT)
Age: 36
Discharge: HOME | End: 2021-03-17
Payer: COMMERCIAL

## 2021-03-17 VITALS
WEIGHT: 210.2 LBS | TEMPERATURE: 97.8 F | DIASTOLIC BLOOD PRESSURE: 90 MMHG | BODY MASS INDEX: 32.99 KG/M2 | SYSTOLIC BLOOD PRESSURE: 140 MMHG | RESPIRATION RATE: 16 BRPM | HEIGHT: 67 IN

## 2021-03-17 DIAGNOSIS — M79.642 LEFT HAND PAIN: ICD-10-CM

## 2021-03-17 DIAGNOSIS — M25.532 LEFT WRIST PAIN: ICD-10-CM

## 2021-03-17 DIAGNOSIS — R03.0 ELEVATED BP WITHOUT DIAGNOSIS OF HYPERTENSION: ICD-10-CM

## 2021-03-17 DIAGNOSIS — M25.532 LEFT WRIST PAIN: Primary | ICD-10-CM

## 2021-03-17 DIAGNOSIS — M79.642 LEFT HAND PAIN: Primary | ICD-10-CM

## 2021-03-17 PROCEDURE — 1036F TOBACCO NON-USER: CPT | Performed by: NURSE PRACTITIONER

## 2021-03-17 PROCEDURE — G8417 CALC BMI ABV UP PARAM F/U: HCPCS | Performed by: NURSE PRACTITIONER

## 2021-03-17 PROCEDURE — 73130 X-RAY EXAM OF HAND: CPT

## 2021-03-17 PROCEDURE — 99214 OFFICE O/P EST MOD 30 MIN: CPT | Performed by: NURSE PRACTITIONER

## 2021-03-17 PROCEDURE — G8484 FLU IMMUNIZE NO ADMIN: HCPCS | Performed by: NURSE PRACTITIONER

## 2021-03-17 PROCEDURE — 73110 X-RAY EXAM OF WRIST: CPT

## 2021-03-17 PROCEDURE — G8427 DOCREV CUR MEDS BY ELIG CLIN: HCPCS | Performed by: NURSE PRACTITIONER

## 2021-03-17 ASSESSMENT — ENCOUNTER SYMPTOMS: BACK PAIN: 0

## 2021-03-17 NOTE — PROGRESS NOTES
3/17/2021     Aniyah Quinones (:  1985) is a 31 y.o. female, here for evaluation of the following medical concerns:    HPI  Pt c/o left wrist and left hand pain for the past 3 weeks. No known injury. She states that sometimes when she is trying to grab an object her \"hand pops out of place\". C/o constant pain and is can not hold heavy objects d/t decreased strength. Denies redness, swelling, numbness or tingling. Has tried wearing a wrist splint with mild relief. Review of Systems   Constitutional: Negative. Musculoskeletal: Positive for arthralgias. Negative for back pain, gait problem, joint swelling, myalgias, neck pain and neck stiffness. Skin: Negative. Prior to Visit Medications    Medication Sig Taking?  Authorizing Provider   Blood Pressure KIT 1 Units by Does not apply route 2 times daily  Patient not taking: Reported on 3/17/2021  Cyril Carlin DO   furosemide (LASIX) 20 MG tablet Take 1 tablet by mouth daily  Patient not taking: Reported on 3/35/2104  Fabiola Yuan MD   potassium chloride (KLOR-CON M) 10 MEQ extended release tablet Take 1 tablet by mouth 2 times daily  Patient not taking: Reported on 2693  Fabiola Yuan MD   ferrous sulfate (IRON 325) 325 (65 Fe) MG tablet Take 1 tablet by mouth daily (with breakfast)  Patient not taking: Reported on 3/17/2021  Shahid Quintana DO   docusate sodium (COLACE, DULCOLAX) 100 MG CAPS Take 100 mg by mouth 2 times daily as needed for Constipation  Patient not taking: Reported on 3/17/2021  Shahid Quintana DO   Prenatal Vit-Fe Fumarate-FA (PRENATAL LOW IRON) 27-0.8 MG TABS Take 1 tablet by mouth daily  Patient not taking: Reported on 3/17/2021  Almeta Meigs, MD   ondansetron (ZOFRAN) 4 MG tablet Take 1 tablet by mouth every 8 hours as needed for Nausea  Patient not taking: Reported on 3/17/2021  Cyril Carlin DO        Social History     Tobacco Use    Smoking status: Never Smoker   

## 2021-04-12 ENCOUNTER — TELEPHONE (OUTPATIENT)
Dept: FAMILY MEDICINE CLINIC | Age: 36
End: 2021-04-12

## 2021-04-12 NOTE — TELEPHONE ENCOUNTER
----- Message from Semaj Guallpa sent at 4/12/2021 10:48 AM EDT -----  Subject: Appointment Request    Reason for Call: Routine Physical Exam with PAP    QUESTIONS  Type of Appointment? Established Patient  Reason for appointment request? No appointments available during search  Additional Information for Provider? Pt would like a yearly physical   and blood tests/pap/std testing done.   ---------------------------------------------------------------------------  --------------  CALL BACK INFO  What is the best way for the office to contact you? OK to leave message on   voicemail  Preferred Call Back Phone Number? [phone]  ---------------------------------------------------------------------------  --------------  SCRIPT ANSWERS  Relationship to Patient? Self  Appointment reason? Well Care/Follow Ups  Select a Well Care/Follow Ups appointment reason? Adult Physical Exam   [Medicare Annual Wellness   AWV   PAP   Pelvic]  (If the patient is Medicare / 65+ ask this question) Are you requesting a   Medicare Annual Wellness Visit? No  (If the patient is female   ask this question) Are you requesting a pap smear with your physical   exam? Yes  (Is the patient requesting their annual physical and does not need PAP or   AWV per above)? No  Have you been diagnosed with   tested for   or told that you are suspected of having COVID-19 (Coronavirus)? No  Have you had a fever or taken medication to treat a fever within the past   3 days? No  Have you had a cough   shortness of breath or flu-like symptoms within the past 3 days? No  Do you currently have flu-like symptoms including fever or chills   cough   shortness of breath   or difficulty breathing   or new loss of taste or smell? No  (Service Expert  click yes below to proceed with [employer] As Usual   Scheduling)?  Yes

## 2021-04-16 ENCOUNTER — NURSE TRIAGE (OUTPATIENT)
Dept: OTHER | Facility: CLINIC | Age: 36
End: 2021-04-16
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
## 2021-04-16 NOTE — TELEPHONE ENCOUNTER
Reason for Disposition   Patient is worried about a sexually transmitted disease (STD)    Protocols used: VAGINAL DISCHARGE-ADULT-[place]

## 2021-04-19 ENCOUNTER — OFFICE VISIT (OUTPATIENT)
Dept: FAMILY MEDICINE CLINIC | Age: 36
End: 2021-04-19
Payer: COMMERCIAL

## 2021-04-19 VITALS
SYSTOLIC BLOOD PRESSURE: 132 MMHG | HEART RATE: 77 BPM | DIASTOLIC BLOOD PRESSURE: 80 MMHG | WEIGHT: 208 LBS | BODY MASS INDEX: 32.58 KG/M2 | OXYGEN SATURATION: 98 %

## 2021-04-19 DIAGNOSIS — N89.8 VAGINAL DISCHARGE: ICD-10-CM

## 2021-04-19 DIAGNOSIS — Z11.3 SCREENING FOR STDS (SEXUALLY TRANSMITTED DISEASES): ICD-10-CM

## 2021-04-19 DIAGNOSIS — R10.2 PELVIC PAIN: Primary | ICD-10-CM

## 2021-04-19 LAB
BILIRUBIN, POC: NORMAL
BLOOD URINE, POC: NORMAL
CLARITY, POC: CLEAR
COLOR, POC: YELLOW
GLUCOSE URINE, POC: NORMAL
KETONES, POC: NORMAL
LEUKOCYTE EST, POC: NORMAL
NITRITE, POC: NORMAL
PH, POC: 7
PROTEIN, POC: NORMAL
SPECIFIC GRAVITY, POC: 1.02
UROBILINOGEN, POC: 0.2

## 2021-04-19 PROCEDURE — 1036F TOBACCO NON-USER: CPT | Performed by: FAMILY MEDICINE

## 2021-04-19 PROCEDURE — G8427 DOCREV CUR MEDS BY ELIG CLIN: HCPCS | Performed by: FAMILY MEDICINE

## 2021-04-19 PROCEDURE — 99213 OFFICE O/P EST LOW 20 MIN: CPT | Performed by: FAMILY MEDICINE

## 2021-04-19 PROCEDURE — G8417 CALC BMI ABV UP PARAM F/U: HCPCS | Performed by: FAMILY MEDICINE

## 2021-04-19 PROCEDURE — 81002 URINALYSIS NONAUTO W/O SCOPE: CPT | Performed by: FAMILY MEDICINE

## 2021-04-19 NOTE — PROGRESS NOTES
Sirisha Yoder (:  1985) is a 31 y.o. female,Established patient, here for evaluation of the following chief complaint(s):  Sexually Transmitted Diseases (pt has had pelvic pain, lower back pain, not sure what she has been exposed too )      ASSESSMENT/PLAN:  1. Pelvic pain  -     POCT Urinalysis no Micro  -     VAGINAL PATHOGENS PROBE *A  -     C.trachomatis N.gonorrhoeae DNA, Urine  -     Culture, Urine  2. Vaginal discharge  -     VAGINAL PATHOGENS PROBE *A  -     C.trachomatis N.gonorrhoeae DNA, Urine  -     Culture, Urine  3. Screening for STDs (sexually transmitted diseases)  -     VAGINAL PATHOGENS PROBE *A  -     C.trachomatis N.gonorrhoeae DNA, Urine  Tested negative for hep B and syphilis 2 months ago, tested negative for HIV in 2021. No new partners since that time. Offered retesting, but she did not feel she wanted to do blood work at this time. No follow-ups on file. SUBJECTIVE/OBJECTIVE:  Sexually Transmitted Diseases   The patient's primary symptoms include a discharge and pelvic pain. The patient's pertinent negatives include no dysuria, genital itching, genital lesions or genital rash. This is a new problem. The current episode started 1 to 4 weeks ago. The problem has been unchanged. Vaginal discharge characteristics: she is not sure if this is normal for her or not. Pertinent negatives include no fever or urinary frequency. She has tried nothing for the symptoms. Risk factors include history of STDs (has had GC, CT, and herpes in the past). Patient is unsure if her sexual partner has an STD. She states when she last spent time with him, he seemed to be urinating a lot. She has not had sexual contact with him since 2021. She just wasn't feeling right, so wanted to come in to get STDs ruled out. Review of Systems   Constitutional: Negative for fever. Genitourinary: Positive for pelvic pain and vaginal discharge.  Negative for dysuria, frequency and vaginal 6. 8     Albumin 02/19/2021 3.5     Albumin/Globulin Ratio 02/19/2021 1.1     Total Bilirubin 02/19/2021 0.4     Alkaline Phosphatase 02/19/2021 64     ALT 02/19/2021 25     AST 02/19/2021 20     Globulin 02/19/2021 3.3     WBC 02/19/2021 5.3     RBC 02/19/2021 4.25     Hemoglobin 02/19/2021 12.3     Hematocrit 02/19/2021 38.4     MCV 02/19/2021 90.3     MCH 02/19/2021 29.0     MCHC 02/19/2021 32.1     RDW 02/19/2021 15.4     Platelets 25/08/0925 259     MPV 02/19/2021 8.2     Neutrophils % 02/19/2021 55.3     Lymphocytes % 02/19/2021 28.2     Monocytes % 02/19/2021 14.9     Eosinophils % 02/19/2021 0.9     Basophils % 02/19/2021 0.7     Neutrophils Absolute 02/19/2021 2.9     Lymphocytes Absolute 02/19/2021 1.5     Monocytes Absolute 02/19/2021 0.8     Eosinophils Absolute 02/19/2021 0.0     Basophils Absolute 02/19/2021 0.0     LD 02/19/2021 404*    Uric Acid, Serum 02/19/2021 5.2     Protein, Ur 02/19/2021 96.00*    Creatinine, Ur 02/19/2021 103.5     Protein/Creat Ratio 02/19/2021 0.9    Admission on 02/14/2021, Discharged on 02/14/2021   Component Date Value    Ventricular Rate 02/14/2021 75     Atrial Rate 02/14/2021 75     P-R Interval 02/14/2021 172     QRS Duration 02/14/2021 72     Q-T Interval 02/14/2021 378     QTc Calculation (Bazett) 02/14/2021 422     P Axis 02/14/2021 56     R Axis 02/14/2021 89     T Axis 02/14/2021 57     Diagnosis 02/14/2021 Normal sinus rhythmPossible Septal infarct , age undeterminedAbnormal ECGConfirmed by Deb Wolfe MD, Jacqueline Martinez ([phone]) on 2/15/2021 11:22:30 AM     WBC 02/14/2021 9.6     RBC 02/14/2021 3.78*    Hemoglobin 02/14/2021 10.9*    Hematocrit 02/14/2021 33.9*    MCV 02/14/2021 89.6     MCH 02/14/2021 28.8     MCHC 02/14/2021 32.2     RDW 02/14/2021 14.6     Platelets 11/03/2850 251     MPV 02/14/2021 8.2     Neutrophils % 02/14/2021 63.1     Lymphocytes % 02/14/2021 23.0     Monocytes % 02/14/2021 10.5     Eosinophils % 02/14/2021 2.3     Basophils % 02/14/2021 1.1     Neutrophils Absolute 02/14/2021 6.1     Lymphocytes Absolute 02/14/2021 2.2     Monocytes Absolute 02/14/2021 1.0     Eosinophils Absolute 02/14/2021 0.2     Basophils Absolute 02/14/2021 0.1     Sodium 02/14/2021 137     Potassium reflex Magnesi* 02/14/2021 3.6     Chloride 02/14/2021 103     CO2 02/14/2021 25     Anion Gap 02/14/2021 9     Glucose 02/14/2021 114*    BUN 02/14/2021 9     CREATININE 02/14/2021 0.8     GFR Non- 02/14/2021 >60     GFR  02/14/2021 >60     Calcium 02/14/2021 9.0     Troponin 02/14/2021 <0.01     Total Protein 02/14/2021 6.3*    Albumin 02/14/2021 3.6     Alkaline Phosphatase 02/14/2021 81     ALT 02/14/2021 37     AST 02/14/2021 27     Total Bilirubin 02/14/2021 0.4     Bilirubin, Direct 02/14/2021 <0.2     Bilirubin, Indirect 02/14/2021 see below     LD 02/14/2021 310*    Color, UA 02/14/2021 Yellow     Clarity, UA 02/14/2021 Clear     Glucose, Ur 02/14/2021 Negative     Bilirubin Urine 02/14/2021 Negative     Ketones, Urine 02/14/2021 Negative     Specific Gravity, UA 02/14/2021 1.015     Blood, Urine 02/14/2021 LARGE*    pH, UA 02/14/2021 7.0     Protein, UA 02/14/2021 Negative     Urobilinogen, Urine 02/14/2021 0.2     Nitrite, Urine 02/14/2021 Negative     Leukocyte Esterase, Urine 02/14/2021 Negative     Microscopic Examination 02/14/2021 YES     Urine Type 02/14/2021 NotGiven     Uric Acid, Serum 02/14/2021 3.7     WBC, UA 02/14/2021 0-2     RBC, UA 02/14/2021 21-50*    Epithelial Cells, UA 02/14/2021 2-5    Admission on 02/09/2021, Discharged on 02/11/2021   Component Date Value    WBC 02/09/2021 10.2     RBC 02/09/2021 4.39     Hemoglobin 02/09/2021 12.7     Hematocrit 02/09/2021 38.7     MCV 02/09/2021 88.2     MCH 02/09/2021 29.1     MCHC 02/09/2021 33.0     RDW 02/09/2021 14.7     Platelets 07/22/3361 265     MPV 02/09/2021 7.9     Neutrophils % 02/09/2021 65.2     Lymphocytes % 02/09/2021 21.7     Monocytes % 02/09/2021 12.2     Eosinophils % 02/09/2021 0.4     Basophils % 02/09/2021 0.5     Neutrophils Absolute 02/09/2021 6.7     Lymphocytes Absolute 02/09/2021 2.2     Monocytes Absolute 02/09/2021 1.2     Eosinophils Absolute 02/09/2021 0.0     Basophils Absolute 02/09/2021 0.0     ABO/Rh 02/09/2021 AB POS     Antibody Screen 02/09/2021 CANCELED     Total Syphillis IgG/IgM 02/09/2021 Non-Reactive     Antibody Screen 02/09/2021 NEG     Amphetamine Screen, Urine 02/09/2021 Neg     Barbiturate Screen, Ur 02/09/2021 Neg     Benzodiazepine Screen, U* 02/09/2021 Neg     Cannabinoid Scrn, Ur 02/09/2021 Neg     Cocaine Metabolite Scree* 02/09/2021 Neg     Opiate Scrn, Ur 02/09/2021 Neg     PCP Screen, Urine 02/09/2021 Neg     Methadone Screen, Urine 02/09/2021 Neg     Propoxyphene Scrn, Ur 02/09/2021 Neg     Oxycodone Urine 02/09/2021 Neg     Buprenorphine Urine 02/09/2021 Neg     pH, UA 02/09/2021 7.0     Drug Screen Comment: 02/09/2021 see below     Hep B S Ag Interp 02/09/2021 Non-reactive     SARS-CoV-2, NAAT 02/09/2021 Not Detected     WBC 02/10/2021 13.0*    RBC 02/10/2021 3.63*    Hemoglobin 02/10/2021 10.6*    Hematocrit 02/10/2021 32.3*    MCV 02/10/2021 89.0     MCH 02/10/2021 29.2     MCHC 02/10/2021 32.8     RDW 02/10/2021 14.7     Platelets 42/00/6630 211     MPV 02/10/2021 8.0    Routine Prenatal on 01/18/2021   Component Date Value    Organism 01/18/2021 BHS Group B (Strep agalacticae)*    Group B Strep Culture 01/18/2021                      Value:POSITIVE For  Susceptibility testing of penicillin and other beta lactams is  not necessary for beta hemolytic Streptococci since resistant  strains have not been identified.  (CLSI M100)     Routine Prenatal on 12/07/2020   Component Date Value    Glucose, Fasting 12/07/2020 76     Glucose, GTT - 1 Hour 12/07/2020 146     Glucose, GTT - 2 Hour 12/07/2020 136     Glucose, GTT - 3 Hour 12/07/2020 125     C. trachomatis DNA 12/07/2020 Negative     N. gonorrhoeae DNA 12/07/2020 Negative     Trichomonas Vaginalis DNA 12/07/2020                      Value:Negative DNA not detected. Normal range: Negative DNA not detected.  GARDNERELLA VAGINALIS, D* 12/07/2020 *                    Value:POSITIVE DNA Probe detected. Normal range: Negative DNA not detected.  ELIER SPECIES, DNA PRO* 12/07/2020                      Value:Negative DNA not detected. Normal range: Negative DNA not detected. Routine Prenatal on 11/23/2020   Component Date Value    WBC 11/23/2020 8.6     RBC 11/23/2020 3.93*    Hemoglobin 11/23/2020 12.1     Hematocrit 11/23/2020 36.1     MCV 11/23/2020 92.0     MCH 11/23/2020 30.9     MCHC 11/23/2020 33.6     RDW 11/23/2020 14.0     Platelets 91/69/0560 252     MPV 11/23/2020 8.3     Neutrophils % 11/23/2020 72.0     Lymphocytes % 11/23/2020 18.6     Monocytes % 11/23/2020 8.7     Eosinophils % 11/23/2020 0.4     Basophils % 11/23/2020 0.3     Neutrophils Absolute 11/23/2020 6.2     Lymphocytes Absolute 11/23/2020 1.6     Monocytes Absolute 11/23/2020 0.7     Eosinophils Absolute 11/23/2020 0.0     Basophils Absolute 11/23/2020 0.0     Gluc Chal 11/23/2020 143*            An electronic signature was used to authenticate this note.     --Petar Serra MD

## 2021-04-20 LAB
C. TRACHOMATIS DNA ,URINE: NEGATIVE
CANDIDA SPECIES, DNA PROBE: NORMAL
GARDNERELLA VAGINALIS, DNA PROBE: NORMAL
N. GONORRHOEAE DNA, URINE: NEGATIVE
TRICHOMONAS VAGINALIS DNA: NORMAL

## 2021-04-21 LAB — URINE CULTURE, ROUTINE: NORMAL

## 2021-06-03 ENCOUNTER — OFFICE VISIT (OUTPATIENT)
Dept: FAMILY MEDICINE CLINIC | Age: 36
End: 2021-06-03
Payer: COMMERCIAL

## 2021-06-03 VITALS
BODY MASS INDEX: 33.42 KG/M2 | WEIGHT: 213.4 LBS | DIASTOLIC BLOOD PRESSURE: 90 MMHG | SYSTOLIC BLOOD PRESSURE: 120 MMHG | HEART RATE: 90 BPM | OXYGEN SATURATION: 97 %

## 2021-06-03 DIAGNOSIS — J35.1 ENLARGED TONSILS: Primary | ICD-10-CM

## 2021-06-03 DIAGNOSIS — J35.8 TONSIL STONE: ICD-10-CM

## 2021-06-03 PROCEDURE — 1036F TOBACCO NON-USER: CPT | Performed by: FAMILY MEDICINE

## 2021-06-03 PROCEDURE — G8427 DOCREV CUR MEDS BY ELIG CLIN: HCPCS | Performed by: FAMILY MEDICINE

## 2021-06-03 PROCEDURE — G8417 CALC BMI ABV UP PARAM F/U: HCPCS | Performed by: FAMILY MEDICINE

## 2021-06-03 PROCEDURE — 99213 OFFICE O/P EST LOW 20 MIN: CPT | Performed by: FAMILY MEDICINE

## 2021-06-03 RX ORDER — CETIRIZINE HYDROCHLORIDE 10 MG/1
10 TABLET ORAL DAILY
Qty: 30 TABLET | Refills: 0 | Status: SHIPPED | OUTPATIENT
Start: 2021-06-03 | End: 2021-07-03

## 2021-06-03 RX ORDER — FLUTICASONE PROPIONATE 50 MCG
2 SPRAY, SUSPENSION (ML) NASAL DAILY
Qty: 1 BOTTLE | Refills: 5 | Status: SHIPPED | OUTPATIENT
Start: 2021-06-03

## 2021-06-03 ASSESSMENT — ENCOUNTER SYMPTOMS
TROUBLE SWALLOWING: 1
RHINORRHEA: 0
FACIAL SWELLING: 0
SORE THROAT: 1
SINUS PRESSURE: 0
SINUS PAIN: 0
NAUSEA: 0
SHORTNESS OF BREATH: 0
COLOR CHANGE: 0
VOICE CHANGE: 0

## 2021-06-03 NOTE — PROGRESS NOTES
6/3/2021    This is a 31 y.o. female who presents for  Chief Complaint   Patient presents with    Pharyngitis     tinsil stones, been over a year, inflamed       HPI:     + recurrent pharyngitis  + tonsils stones  She states she can push with a q tip and squeeze them out  Has intermittent pus/drainage  No fevers currently   Intermittent pain, trouble swallowing     Past Medical History:   Diagnosis Date    Abnormal Pap smear of cervix     Anemia     Atypical squamous cells of undetermined significance (ASCUS) on Papanicolaou smear of cervix 08/05/2020    Depression     Fetal abnormality affecting management of mother 08/05/2020    Herpes simplex infection of genitourinary system     History of chlamydia infection     History of gonorrhea     Low grade squamous intraepithelial cervical dysplasia affecting pregnancy, antepartum 08/05/2020    f/o ppartum    Overactive bladder     Suppressed lactation, delivered with postpartum condition 08/05/2020    Thyroid disease        No past surgical history on file. Social History     Socioeconomic History    Marital status: Single     Spouse name: Not on file    Number of children: Not on file    Years of education: Not on file    Highest education level: Not on file   Occupational History    Not on file   Tobacco Use    Smoking status: Never Smoker    Smokeless tobacco: Never Used   Vaping Use    Vaping Use: Never used   Substance and Sexual Activity    Alcohol use: Not Currently    Drug use: Never    Sexual activity: Not Currently   Other Topics Concern    Not on file   Social History Narrative    Not on file     Social Determinants of Health     Financial Resource Strain:     Difficulty of Paying Living Expenses:    Food Insecurity:     Worried About Running Out of Food in the Last Year:     [address] in the Last Year:    Transportation Needs:     Lack of Transportation (Medical):      Lack of Transportation (Non-Medical):    Physical Activity:     Days of Exercise per Week:     Minutes of Exercise per Session:    Stress:     Feeling of Stress :    Social Connections:     Frequency of Communication with Friends and Family:     Frequency of Social Gatherings with Friends and Family:     Attends Religion Services:     Active Member of Clubs or Organizations:     Attends Club or Organization Meetings:     Marital Status:    Intimate Partner Violence:     Fear of Current or Ex-Partner:     Emotionally Abused:     Physically Abused:     Sexually Abused:        Family History   Problem Relation Age of Onset    Diabetes Maternal Grandmother     Hypertension Maternal Grandmother     Glaucoma Maternal Grandmother     No Known Problems Father     No Known Problems Mother        Current Outpatient Medications   Medication Sig Dispense Refill    fluticasone (FLONASE) 50 MCG/ACT nasal spray 2 sprays by Each Nostril route daily 1 Bottle 5    cetirizine (ZYRTEC) 10 MG tablet Take 1 tablet by mouth daily 30 tablet 0     No current facility-administered medications for this visit.        Immunization History   Administered Date(s) Administered    Anthrax (BioThrax) 09/18/2007, 11/09/2007    COVID-19, Pfizer, PF, 30mcg/0.3mL 03/29/2021    HPV (Human Papilloma Virus)Vaccine 11/15/2011    HPV Quadrivalent (Gardasil) 03/19/2007, 04/18/2007, 01/15/2008    Hepatitis A Adult (Havrix, Vaqta) 10/11/2005, 03/19/2007    Hepatitis A/Hepatitis B (Twinrix) 10/11/2005, 11/14/2005, 03/19/2007    Hepatitis B 10/11/2005, 11/14/2005, 03/19/2007    Influenza A (H5E9-47) Vaccine IM 01/29/2010    Influenza A (I7w5-46),all Formulations 02/28/2010    Influenza Virus Vaccine 11/01/2005, 11/05/2005, 03/19/2007, 11/09/2007, 11/23/2009, 01/29/2010    MMR 10/11/2005    Meningococcal MPSV4 (Menomune) 10/11/2005    PPD Test 03/19/2007, 01/15/2008, 01/29/2010    Polio IPV (IPOL) 10/11/2005    Td, unspecified formulation 10/11/2005    Typhoid Vi capsular polysaccharide (Typhim VI) 11/14/2005    Vaccinia - Smallpox ([id number]) 11/06/2007, 11/09/2007    Yellow Fever (YF-Vax) 11/14/2005       Allergies   Allergen Reactions    Ibuprofen Swelling       Review of Systems   Constitutional: Negative for activity change, fever and unexpected weight change. HENT: Positive for postnasal drip, sore throat and trouble swallowing. Negative for congestion, facial swelling, mouth sores, rhinorrhea, sinus pressure, sinus pain, sneezing, tinnitus and voice change. Respiratory: Negative for shortness of breath. Cardiovascular: Negative for chest pain. Gastrointestinal: Negative for nausea. Musculoskeletal: Negative for arthralgias. Skin: Negative for color change. Allergic/Immunologic: Negative for immunocompromised state. Hematological: Negative for adenopathy. Psychiatric/Behavioral: Negative for sleep disturbance. BP (!) 120/90 (Site: Left Upper Arm, Position: Sitting, Cuff Size: Large Adult)   Pulse 90   Wt 213 lb 6.4 oz (96.8 kg)   SpO2 97%   BMI 33.42 kg/m²     Physical Exam  Vitals and nursing note reviewed. Constitutional:       General: She is not in acute distress. Appearance: Normal appearance. She is well-developed. She is not diaphoretic. HENT:      Head: Normocephalic and atraumatic. Mouth/Throat:      Mouth: Mucous membranes are moist.      Palate: No lesions. Pharynx: Posterior oropharyngeal erythema present. No pharyngeal swelling, oropharyngeal exudate or uvula swelling. Tonsils: No tonsillar exudate or tonsillar abscesses. Eyes:      Extraocular Movements: Extraocular movements intact. Conjunctiva/sclera: Conjunctivae normal.      Pupils: Pupils are equal, round, and reactive to light. Cardiovascular:      Rate and Rhythm: Normal rate and regular rhythm. Pulmonary:      Effort: Pulmonary effort is normal. No respiratory distress. Abdominal:      Palpations: Abdomen is soft.    Musculoskeletal: General: Normal range of motion. Cervical back: Normal range of motion and neck supple. Skin:     General: Skin is warm and dry. Neurological:      Mental Status: She is alert and oriented to person, place, and time. Psychiatric:         Attention and Perception: She is attentive. Mood and Affect: Mood normal.         Speech: Speech normal.         Behavior: Behavior normal.         Thought Content: Thought content normal.         Judgment: Judgment normal.         Plan  1. Enlarged tonsils  She is interested in discussing a tonsillectomy. No s/s of infection today. Reiterated she did not need abx despite \"the phone people telling her I would prescribe her medication for it. \"   - Parker Calles DO, Otolaryngology, [facility]) 50 MCG/ACT nasal spray; 2 sprays by Each Nostril route daily  Dispense: 1 Bottle; Refill: 5  - cetirizine (ZYRTEC) 10 MG tablet; Take 1 tablet by mouth daily  Dispense: 30 tablet; Refill: 0    2. Tonsil stone  - [address] [place] [address], Fabian Michelle DO, Otolaryngology, [facility]) 50 MCG/ACT nasal spray; 2 sprays by Each Nostril route daily  Dispense: 1 Bottle; Refill: 5  - cetirizine (ZYRTEC) 10 MG tablet; Take 1 tablet by mouth daily  Dispense: 30 tablet; Refill: 0        While assessing care for this patient, I have reviewed all pertinent lab work/imaging/ specialist notes and care in reference to those problems addressed above in detail. Appropriate medical decision making was based on this. Please note that portions of this note may have been completed with a voice recognition program. Efforts were made to edit the dictations but occasionally words are mis-transcribed. Return if symptoms worsen or fail to improve.

## 2021-06-08 ENCOUNTER — OFFICE VISIT (OUTPATIENT)
Dept: ENT CLINIC | Age: 36
End: 2021-06-08
Payer: COMMERCIAL

## 2021-06-08 VITALS
SYSTOLIC BLOOD PRESSURE: 117 MMHG | WEIGHT: 211 LBS | BODY MASS INDEX: 33.12 KG/M2 | DIASTOLIC BLOOD PRESSURE: 76 MMHG | HEIGHT: 67 IN | TEMPERATURE: 98.2 F | HEART RATE: 86 BPM

## 2021-06-08 DIAGNOSIS — J35.03 CHRONIC TONSILLITIS AND ADENOIDITIS: Primary | ICD-10-CM

## 2021-06-08 PROCEDURE — G8427 DOCREV CUR MEDS BY ELIG CLIN: HCPCS | Performed by: STUDENT IN AN ORGANIZED HEALTH CARE EDUCATION/TRAINING PROGRAM

## 2021-06-08 PROCEDURE — G8417 CALC BMI ABV UP PARAM F/U: HCPCS | Performed by: STUDENT IN AN ORGANIZED HEALTH CARE EDUCATION/TRAINING PROGRAM

## 2021-06-08 PROCEDURE — 1036F TOBACCO NON-USER: CPT | Performed by: STUDENT IN AN ORGANIZED HEALTH CARE EDUCATION/TRAINING PROGRAM

## 2021-06-08 PROCEDURE — 99213 OFFICE O/P EST LOW 20 MIN: CPT | Performed by: STUDENT IN AN ORGANIZED HEALTH CARE EDUCATION/TRAINING PROGRAM

## 2021-06-08 ASSESSMENT — ENCOUNTER SYMPTOMS
EYE PAIN: 0
SORE THROAT: 1
NAUSEA: 0
VOMITING: 0
COUGH: 0
RHINORRHEA: 0
SHORTNESS OF BREATH: 0

## 2021-06-08 NOTE — PROGRESS NOTES
[facility]      Patient Name: Vicky Durand  Medical Record Number:  [id number]  Primary Care Physician:  Kena Romero MD  Date of Consultation: 2021    Chief Complaint:   Chief Complaint   Patient presents with    Pharyngitis     State that food gets caught on her tonsils. her tonsils are inflammed. This started abouit a year ago. States she feels like there is something stuck her her throat. HISTORY OF PRESENT ILLNESS  Amador Starr is a(n) 31 y.o. female who presents with tonsil stones. She states over the last year she has had significant issues with her tonsils. She continually gets stones which she picks out with toothpicks or whatever mechanism possible. This is a daily occurrence. She has tried swallow water rinses without any relief. She complains of foul smelling breath. Her throat is sore all the time. He recently had a baby 4 months ago. Patient Active Problem List   Diagnosis    Abnormal glucose    Adjustment disorder with anxiety    Adjustment disorder with mixed disturbance of emotions and conduct    Bacterial vaginosis    Depressive disorder    Employment problem    Flexural eczema    Gastro-esophageal reflux    Goiter    Hyperpigmentation of skin    Localized enlarged lymph nodes    Panic disorder with agoraphobia    Posttraumatic stress disorder    Regular astigmatism    Vitamin D deficiency    Nausea and vomiting of pregnancy, antepartum    Prenatal care in second trimester    Hx of fetal anomaly in prior pregnancy, currently pregnant    GBS (group b Streptococcus) UTI complicating pregnancy, third trimester    History of tobacco use     (normal spontaneous vaginal delivery)     History reviewed. No pertinent surgical history.   Family History   Problem Relation Age of Onset    Diabetes Maternal Grandmother     Hypertension Maternal Grandmother     Glaucoma Maternal Grandmother     No Known revealed the following in addition to any already discussed in the HPI:    Review of Systems   Constitutional: Negative for fatigue and fever. HENT: Positive for sore throat. Negative for congestion, ear pain, postnasal drip, rhinorrhea and sneezing. Eyes: Negative for pain and visual disturbance. Respiratory: Negative for cough and shortness of breath. Cardiovascular: Negative for chest pain. Gastrointestinal: Negative for nausea and vomiting. Endocrine: Negative. Genitourinary: Negative. Musculoskeletal: Negative for neck pain and neck stiffness. Skin: Negative for rash. Neurological: Negative for dizziness and headaches. PHYSICAL EXAM  /76 (Site: Right Upper Arm, Position: Sitting, Cuff Size: Large Adult)   Pulse 86   Temp 98.2 °F (36.8 °C)   Ht 5' 7\" (1.702 m)   Wt 211 lb (95.7 kg)   BMI 33.05 kg/m²     GENERAL: No Acute Distress, Alert and Oriented, no hoarseness  EYES: EOMI, Anti-icteric  NOSE: No epistaxis, nasal mucosa within normal limits, no purulent drainage  EARS: Normal external canal appearance, EAC patent bilaterally, TM intact bilaterally with no evidence of effusions  FACE: 1/6 House-Brackmann Scale, symmetric, sensation equal bilaterally  ORAL CAVITY: No masses or lesions palpated, uvula is midline, moist mucous membranes, 2+ cryptic tonsils with stones present  NECK: Normal range of motion, no thyromegaly, trachea is midline, no lymphadenopathy, no neck masses, no crepitus  CHEST: Normal respiratory effort, no retractions, breathing comfortably  SKIN: No rashes, normal appearing skin, no evidence of skin lesions/tumors    RADIOLOGY  Summary of findings:      PROCEDURE      ASSESSMENT/PLAN  Maricel Escobar is a very pleasant 31 y.o. female with     1. Chronic tonsillitis and adenoiditis  Patient has tonsil stones and chronic pharyngitis. She has tried conservative treatment without success. Risk benefits and alternatives to surgery were discussed with the patient. Risks include but not limited to significant pain, bleeding, and the antecedent risks of anesthesia and surgery in general.  She is amenable to surgery and will likely schedule in near future. She does have a 3month-old child and I said that she would need help with the child during her postop recovery as she will be on pain medicine and unable to care for them. She will arrange for help at home prior to scheduling surgery. Postop instructions were given so she could gauge how much help she will need. She will follow-up if she has further questions otherwise we will see him of the surgery. Medical Decision Making:   The following items were considered in medical decision making:  Independent review of images  Review / order clinical lab tests  Review / order radiology tests  Decision to obtain old records

## 2021-06-08 NOTE — PATIENT INSTRUCTIONS
Tonsillectomy   Post Operative Instructions    [facility] ENT Number (24 hours): [phone]    Effects of Anesthesia  Tonsillectomy (with or without Adenoidectomy) involves a brief anesthesia, typically 20 - 60 minutes. Patients may be quite irritable for several hours after surgery. If sedatives were given, some patients will remain sleepy for much of the day. Nausea and vomiting is occasionally seen, and usually resolves by the evening of surgery - even without additional medications. Medications    Tonsillectomy is a painful procedure. Pain medications help but do not  completely alleviate the discomfort. ADULTS  Adults will be prescribed a narcotic pain pill or elixir (Percocet, Norco, Vicodin, Lortab are some examples). Do not use aspirin products (Cindas, Ed powders, Excedrin) - they may increase the chance of bleeding. Every time you take a dose of pain medication, do so with some food or full liquid to prevent nausea. The best thing to take with the medication is a cup of pudding or ice cream, a milkshake or cup of milk. Activity  Vigorous exercise should be avoided for 14 days after surgery. This risk of bleeding is increased with increased activity and bleeding from where the tonsils were removed can happen for up to 2 weeks after surgery. Baths and showers are fine. Many patients have reduced energy levels until their pain decreases and they are taking in more nourishment and calories. You should not travel out of the local area for a full 2 weeks after surgery in case you experience bleeding after surgery. Eating & Drinking  Dehydration is the biggest enemy in the recovery period. It will increase the pain, increase the risk of bleeding and delay the healing. It usually happens because  the pain of swallowing keeps the patient from drinking enough liquids. Therefore, the key is to force fluids, and that works best when pain control is maximized.     You cannot drink too much after having a tonsillectomy. The only drinks to avoid are citrus like orange and grapefruit juices because they will burn the back of the  throat. Incentive charts with prizes work very well to get young children to drink fluids and take their medications after surgery. Some patients will have a small amount of liquid come out of their nose when they drink after surgery, this should stop within a few weeks after surgery. Although drinking is more important, eating is fine even the day of surgery but avoid foods that are crunchy or have sharp edges. Dairy products may be taken, if desired. You should avoid acidic, salty and spicy foods (especially tomato sauces). Chewing gum or bubble gum encourages swallowing and saliva flow, and may even speed up the healing. Almost everyone loses some weight after tonsillectomy (which is usually regained in the 2nd or 3rd week after surgery). Drinking is far more important that eating in the first 14 days after surgery, so concentrate on that first and foremost. Adequate liquid intake probably speeds recovery. Other things   Pain is usually the worst in the morning; this can be avoided by overnight medication administration if needed.  Since moisture helps soothe the healing throat, a room humidifier (hot or cold) is suggested when the patient is sleeping.  Some patients feel pain relief with an ice collar to the neck (or a bag of frozen peas or corn). Be careful to avoid placing cold plastic directly on the skin - wrap in a paper towel or washcloth.  If the tonsils and adenoids are very large, the patients voice may change after surgery.  The recovery from tonsillectomy is a very painful period, often the worst pain people can recall, so please be understanding and patient with yourself, or the patient you are caring for. It is helpful to take pain medicine during the night if the patient awakens-- the worst pain is usually in the morning.  The pain may seem to increase 2-5 days after surgery - this is normal when inflammation sets in. Please be aware that no combination of medicines will eliminate the pain  the patient will need to continue eating/drinking in spite of the remaining discomfort.  You should not travel outside of the local area for 14 days after surgery in case significant bleeding occurs. What should we expect after surgery? As previously mentioned, most patients have a significant amount of pain after tonsillectomy, with pain resolving 7-14 days after surgery. Older children and adults seem to have more discomfort. Most patients can go home the day of surgery.  Ear pain: Many people will complain of earaches after tonsillectomy. This is caused by referred pain coming from throat and not the ears. Give pain medications and encourage liquid intake.  Fever: Many patients have a low-grade fever after tonsillectomy - up to 101.5 degrees (380 C.) for several days. Higher prolonged fever should be reported to your surgeon.  Bad looking (and bad smelling) throat: After surgery, the place where the tonsils were removed is covered with a white film, which is a moist scab. This usually develops 3-5 days after surgery and falls off 10-14 days after surgery and usually causes bad breath. There will be some redness and swelling as well. The uvula (the part of the throat that hangs down in the middle between the tonsils) is usually swollen for several days after surgery.  Sore/bruised feeling of Tongue: This is common for the first few days after surgery because the tongue is pushed out of the way to take out the tonsils in surgery. When should we call the doctor?  Nausea/Vomiting: This is a common side effect from General Anesthesia and can last up to 24-36 hours after surgery. Try giving sips of clear liquids like Sprite, water or apple juice then gradually increase fluid intake.  If the nausea or vomiting continues beyond this time frame, call the doctors office for medications that will help relieve the nausea and vomiting.  Bleeding: Significant bleeding is rare, but it happens to about 5% of patients who have tonsillectomy. It may come from the nose, the mouth, or be vomited or coughed up. Ice water mouthwashes may help stop or reduce bleeding. If you have bleeding that does not stop, you should call the office (during business hours) or the on call physician (evenings, weekends) or go to the emergency room if you are very concerned.  Dehydration: If there has been little or no liquids intake for 24 hours, the patient may need to come to the hospital for IV fluids. Signs of dehydration include lethargy, the lack of tears when crying, and reduced or very concentrated urine output.  High Fever: If the patient has a consistent temperatures greater than 1020, or when accompanied by cough or difficulty breathing, you should call the doctors office.  If you run out of pain medication: Some patients run out of pain medications prescribed after surgery. If you need more, call the office [address] and more will be prescribed. Keep an eye on your prescription so that you dont run out completely before you can  more, especially before the weekend.

## 2021-06-14 ENCOUNTER — TELEPHONE (OUTPATIENT)
Dept: FAMILY MEDICINE CLINIC | Age: 36
End: 2021-06-14

## 2021-06-14 ENCOUNTER — TELEPHONE (OUTPATIENT)
Dept: ENT CLINIC | Age: 36
End: 2021-06-14

## 2021-06-14 NOTE — TELEPHONE ENCOUNTER
----- Message from Sid Alex sent at 6/14/2021  9:09 AM EDT -----  Subject: Message to Provider    QUESTIONS  Information for Provider? Patient saw Josias Patino, DO, he would like for   the patient to have her Tonsils removed but needs her PCP to sign off on   [place] [facility]   ---------------------------------------------------------------------------  --------------  CALL BACK INFO  What is the best way for the office to contact you? OK to leave message on   voicemail  Preferred Call Back Phone Number? [phone]  ---------------------------------------------------------------------------  --------------  SCRIPT ANSWERS  Relationship to Patient?  Self

## 2021-06-14 NOTE — TELEPHONE ENCOUNTER
We usually do preops for these. Can be scheduled during a 30 min provider fill (not 5 pm slot).  Thanks

## 2021-06-14 NOTE — TELEPHONE ENCOUNTER
Patient would like to set up surgery for the Chronic tonsillitis and adenoiditis, also has questions      [phone] pt phone

## 2021-06-22 ENCOUNTER — TELEPHONE (OUTPATIENT)
Dept: ENT CLINIC | Age: 36
End: 2021-06-22

## 2021-06-23 ENCOUNTER — TELEPHONE (OUTPATIENT)
Dept: OBGYN CLINIC | Age: 36
End: 2021-06-23

## 2021-06-23 RX ORDER — METRONIDAZOLE 500 MG/1
500 TABLET ORAL 2 TIMES DAILY
Qty: 14 TABLET | Refills: 0 | Status: SHIPPED | OUTPATIENT
Start: 2021-06-23 | End: 2021-06-30

## 2021-07-06 ENCOUNTER — TELEPHONE (OUTPATIENT)
Dept: FAMILY MEDICINE CLINIC | Age: 36
End: 2021-07-06

## 2021-07-06 NOTE — TELEPHONE ENCOUNTER
----- Message from Jose L Wilkins sent at 7/6/2021 12:47 PM EDT -----  Subject: Appointment Request    Reason for Call: Routine (Patient Request) No Script    QUESTIONS  Type of Appointment? Established Patient  Reason for appointment request? Available appointments did not meet   patient need  Additional Information for Provider? Patient Stated that she is sweating   more then normal and would like to talk about that. Patient was not sure   if she needed to be seen of if you could just prescribe a deodorant for   her.   ---------------------------------------------------------------------------  --------------  CALL BACK INFO  What is the best way for the office to contact you? OK to leave message on   voicemail  Preferred Call Back Phone Number? [phone]  ---------------------------------------------------------------------------  --------------  SCRIPT ANSWERS  Relationship to Patient? Self  Appointment reason? Symptomatic  Select script based on patient symptoms? Adult No Script  (Is the patient requesting to see the provider for a procedure?)? No  (Is the patient requesting to see the provider urgently  today or   tomorrow. )? No  Have you been diagnosed with, awaiting test results for, or told that you   are suspected of having COVID-19 (Coronavirus)? (If patient has tested   negative or was tested as a requirement for work, school, or travel and   not based on symptoms, answer no)? No  Do you currently have flu-like symptoms including fever or chills, cough,   shortness of breath, difficulty breathing, or new loss of taste or smell? No  Have you had close contact with someone with COVID-19 in the last 14 days? No  (Service Expert  click yes below to proceed with [employer] As Usual   Scheduling)?  Yes

## 2021-07-07 NOTE — TELEPHONE ENCOUNTER
I would have her schedule to be seen and discuss. She may want to also draw lab work. Please help her to schedule.  Thanks, Isreal Melton

## 2021-07-09 ENCOUNTER — TELEPHONE (OUTPATIENT)
Dept: FAMILY MEDICINE CLINIC | Age: 36
End: 2021-07-09

## 2022-05-05 NOTE — ADDENDUM NOTE
Addended by: Drew Powell on: 8/18/2020 09:40 AM     Modules accepted: Orders
3 = A little assistance